# Patient Record
Sex: MALE | Race: WHITE
[De-identification: names, ages, dates, MRNs, and addresses within clinical notes are randomized per-mention and may not be internally consistent; named-entity substitution may affect disease eponyms.]

---

## 2020-08-02 ENCOUNTER — HOSPITAL ENCOUNTER (INPATIENT)
Dept: HOSPITAL 96 - M.ERS | Age: 50
LOS: 3 days | DRG: 871 | End: 2020-08-05
Attending: INTERNAL MEDICINE | Admitting: INTERNAL MEDICINE
Payer: MEDICARE

## 2020-08-02 VITALS — DIASTOLIC BLOOD PRESSURE: 79 MMHG | SYSTOLIC BLOOD PRESSURE: 119 MMHG

## 2020-08-02 VITALS — DIASTOLIC BLOOD PRESSURE: 71 MMHG | SYSTOLIC BLOOD PRESSURE: 125 MMHG

## 2020-08-02 VITALS — DIASTOLIC BLOOD PRESSURE: 72 MMHG | SYSTOLIC BLOOD PRESSURE: 105 MMHG

## 2020-08-02 VITALS — DIASTOLIC BLOOD PRESSURE: 84 MMHG | SYSTOLIC BLOOD PRESSURE: 118 MMHG

## 2020-08-02 VITALS — DIASTOLIC BLOOD PRESSURE: 66 MMHG | SYSTOLIC BLOOD PRESSURE: 117 MMHG

## 2020-08-02 VITALS — DIASTOLIC BLOOD PRESSURE: 50 MMHG | SYSTOLIC BLOOD PRESSURE: 124 MMHG

## 2020-08-02 VITALS — DIASTOLIC BLOOD PRESSURE: 78 MMHG | SYSTOLIC BLOOD PRESSURE: 124 MMHG

## 2020-08-02 VITALS — SYSTOLIC BLOOD PRESSURE: 109 MMHG | DIASTOLIC BLOOD PRESSURE: 53 MMHG

## 2020-08-02 VITALS — SYSTOLIC BLOOD PRESSURE: 117 MMHG | DIASTOLIC BLOOD PRESSURE: 67 MMHG

## 2020-08-02 VITALS — DIASTOLIC BLOOD PRESSURE: 76 MMHG | SYSTOLIC BLOOD PRESSURE: 132 MMHG

## 2020-08-02 VITALS — DIASTOLIC BLOOD PRESSURE: 76 MMHG | SYSTOLIC BLOOD PRESSURE: 135 MMHG

## 2020-08-02 VITALS — DIASTOLIC BLOOD PRESSURE: 75 MMHG | SYSTOLIC BLOOD PRESSURE: 133 MMHG

## 2020-08-02 VITALS — DIASTOLIC BLOOD PRESSURE: 82 MMHG | SYSTOLIC BLOOD PRESSURE: 139 MMHG

## 2020-08-02 VITALS — SYSTOLIC BLOOD PRESSURE: 110 MMHG | DIASTOLIC BLOOD PRESSURE: 74 MMHG

## 2020-08-02 VITALS — DIASTOLIC BLOOD PRESSURE: 56 MMHG | SYSTOLIC BLOOD PRESSURE: 117 MMHG

## 2020-08-02 VITALS — SYSTOLIC BLOOD PRESSURE: 108 MMHG | DIASTOLIC BLOOD PRESSURE: 53 MMHG

## 2020-08-02 VITALS — DIASTOLIC BLOOD PRESSURE: 79 MMHG | SYSTOLIC BLOOD PRESSURE: 138 MMHG

## 2020-08-02 VITALS — SYSTOLIC BLOOD PRESSURE: 124 MMHG | DIASTOLIC BLOOD PRESSURE: 70 MMHG

## 2020-08-02 VITALS — SYSTOLIC BLOOD PRESSURE: 133 MMHG | DIASTOLIC BLOOD PRESSURE: 82 MMHG

## 2020-08-02 VITALS — DIASTOLIC BLOOD PRESSURE: 56 MMHG | SYSTOLIC BLOOD PRESSURE: 112 MMHG

## 2020-08-02 VITALS — DIASTOLIC BLOOD PRESSURE: 70 MMHG | SYSTOLIC BLOOD PRESSURE: 107 MMHG

## 2020-08-02 VITALS — SYSTOLIC BLOOD PRESSURE: 123 MMHG | DIASTOLIC BLOOD PRESSURE: 80 MMHG

## 2020-08-02 VITALS — DIASTOLIC BLOOD PRESSURE: 84 MMHG | SYSTOLIC BLOOD PRESSURE: 135 MMHG

## 2020-08-02 VITALS — DIASTOLIC BLOOD PRESSURE: 79 MMHG | SYSTOLIC BLOOD PRESSURE: 131 MMHG

## 2020-08-02 VITALS — DIASTOLIC BLOOD PRESSURE: 72 MMHG | SYSTOLIC BLOOD PRESSURE: 108 MMHG

## 2020-08-02 VITALS — DIASTOLIC BLOOD PRESSURE: 61 MMHG | SYSTOLIC BLOOD PRESSURE: 129 MMHG

## 2020-08-02 VITALS — SYSTOLIC BLOOD PRESSURE: 107 MMHG | DIASTOLIC BLOOD PRESSURE: 53 MMHG

## 2020-08-02 VITALS — DIASTOLIC BLOOD PRESSURE: 74 MMHG | SYSTOLIC BLOOD PRESSURE: 124 MMHG

## 2020-08-02 VITALS — DIASTOLIC BLOOD PRESSURE: 90 MMHG | SYSTOLIC BLOOD PRESSURE: 129 MMHG

## 2020-08-02 VITALS — DIASTOLIC BLOOD PRESSURE: 67 MMHG | SYSTOLIC BLOOD PRESSURE: 103 MMHG

## 2020-08-02 VITALS — SYSTOLIC BLOOD PRESSURE: 112 MMHG | DIASTOLIC BLOOD PRESSURE: 73 MMHG

## 2020-08-02 VITALS — SYSTOLIC BLOOD PRESSURE: 107 MMHG | DIASTOLIC BLOOD PRESSURE: 70 MMHG

## 2020-08-02 VITALS — SYSTOLIC BLOOD PRESSURE: 126 MMHG | DIASTOLIC BLOOD PRESSURE: 80 MMHG

## 2020-08-02 VITALS — DIASTOLIC BLOOD PRESSURE: 86 MMHG | SYSTOLIC BLOOD PRESSURE: 136 MMHG

## 2020-08-02 VITALS — DIASTOLIC BLOOD PRESSURE: 73 MMHG | SYSTOLIC BLOOD PRESSURE: 129 MMHG

## 2020-08-02 VITALS — DIASTOLIC BLOOD PRESSURE: 81 MMHG | SYSTOLIC BLOOD PRESSURE: 115 MMHG

## 2020-08-02 VITALS — DIASTOLIC BLOOD PRESSURE: 65 MMHG | SYSTOLIC BLOOD PRESSURE: 121 MMHG

## 2020-08-02 VITALS — SYSTOLIC BLOOD PRESSURE: 132 MMHG | DIASTOLIC BLOOD PRESSURE: 75 MMHG

## 2020-08-02 VITALS — SYSTOLIC BLOOD PRESSURE: 113 MMHG | DIASTOLIC BLOOD PRESSURE: 53 MMHG

## 2020-08-02 VITALS — SYSTOLIC BLOOD PRESSURE: 116 MMHG | DIASTOLIC BLOOD PRESSURE: 78 MMHG

## 2020-08-02 VITALS — DIASTOLIC BLOOD PRESSURE: 77 MMHG | SYSTOLIC BLOOD PRESSURE: 107 MMHG

## 2020-08-02 VITALS — DIASTOLIC BLOOD PRESSURE: 73 MMHG | SYSTOLIC BLOOD PRESSURE: 145 MMHG

## 2020-08-02 VITALS — DIASTOLIC BLOOD PRESSURE: 60 MMHG | SYSTOLIC BLOOD PRESSURE: 118 MMHG

## 2020-08-02 VITALS — DIASTOLIC BLOOD PRESSURE: 56 MMHG | SYSTOLIC BLOOD PRESSURE: 106 MMHG

## 2020-08-02 VITALS — DIASTOLIC BLOOD PRESSURE: 77 MMHG | SYSTOLIC BLOOD PRESSURE: 130 MMHG

## 2020-08-02 VITALS — SYSTOLIC BLOOD PRESSURE: 115 MMHG | DIASTOLIC BLOOD PRESSURE: 67 MMHG

## 2020-08-02 VITALS — HEIGHT: 65.98 IN | BODY MASS INDEX: 46.48 KG/M2 | WEIGHT: 289.2 LBS

## 2020-08-02 VITALS — DIASTOLIC BLOOD PRESSURE: 78 MMHG | SYSTOLIC BLOOD PRESSURE: 140 MMHG

## 2020-08-02 VITALS — DIASTOLIC BLOOD PRESSURE: 76 MMHG | SYSTOLIC BLOOD PRESSURE: 111 MMHG

## 2020-08-02 VITALS — SYSTOLIC BLOOD PRESSURE: 116 MMHG | DIASTOLIC BLOOD PRESSURE: 71 MMHG

## 2020-08-02 VITALS — SYSTOLIC BLOOD PRESSURE: 111 MMHG | DIASTOLIC BLOOD PRESSURE: 69 MMHG

## 2020-08-02 VITALS — SYSTOLIC BLOOD PRESSURE: 116 MMHG | DIASTOLIC BLOOD PRESSURE: 83 MMHG

## 2020-08-02 VITALS — DIASTOLIC BLOOD PRESSURE: 77 MMHG | SYSTOLIC BLOOD PRESSURE: 132 MMHG

## 2020-08-02 VITALS — SYSTOLIC BLOOD PRESSURE: 128 MMHG | DIASTOLIC BLOOD PRESSURE: 65 MMHG

## 2020-08-02 VITALS — DIASTOLIC BLOOD PRESSURE: 75 MMHG | SYSTOLIC BLOOD PRESSURE: 141 MMHG

## 2020-08-02 VITALS — SYSTOLIC BLOOD PRESSURE: 116 MMHG | DIASTOLIC BLOOD PRESSURE: 76 MMHG

## 2020-08-02 VITALS — SYSTOLIC BLOOD PRESSURE: 124 MMHG | DIASTOLIC BLOOD PRESSURE: 75 MMHG

## 2020-08-02 VITALS — DIASTOLIC BLOOD PRESSURE: 80 MMHG | SYSTOLIC BLOOD PRESSURE: 127 MMHG

## 2020-08-02 VITALS — DIASTOLIC BLOOD PRESSURE: 55 MMHG | SYSTOLIC BLOOD PRESSURE: 117 MMHG

## 2020-08-02 VITALS — SYSTOLIC BLOOD PRESSURE: 144 MMHG | DIASTOLIC BLOOD PRESSURE: 83 MMHG

## 2020-08-02 VITALS — DIASTOLIC BLOOD PRESSURE: 67 MMHG | SYSTOLIC BLOOD PRESSURE: 114 MMHG

## 2020-08-02 VITALS — SYSTOLIC BLOOD PRESSURE: 131 MMHG | DIASTOLIC BLOOD PRESSURE: 74 MMHG

## 2020-08-02 VITALS — DIASTOLIC BLOOD PRESSURE: 83 MMHG | SYSTOLIC BLOOD PRESSURE: 135 MMHG

## 2020-08-02 VITALS — SYSTOLIC BLOOD PRESSURE: 111 MMHG | DIASTOLIC BLOOD PRESSURE: 76 MMHG

## 2020-08-02 VITALS — DIASTOLIC BLOOD PRESSURE: 77 MMHG | SYSTOLIC BLOOD PRESSURE: 115 MMHG

## 2020-08-02 VITALS — SYSTOLIC BLOOD PRESSURE: 122 MMHG | DIASTOLIC BLOOD PRESSURE: 56 MMHG

## 2020-08-02 VITALS — SYSTOLIC BLOOD PRESSURE: 124 MMHG | DIASTOLIC BLOOD PRESSURE: 58 MMHG

## 2020-08-02 VITALS — SYSTOLIC BLOOD PRESSURE: 120 MMHG | DIASTOLIC BLOOD PRESSURE: 80 MMHG

## 2020-08-02 VITALS — DIASTOLIC BLOOD PRESSURE: 81 MMHG | SYSTOLIC BLOOD PRESSURE: 117 MMHG

## 2020-08-02 VITALS — DIASTOLIC BLOOD PRESSURE: 82 MMHG | SYSTOLIC BLOOD PRESSURE: 143 MMHG

## 2020-08-02 VITALS — SYSTOLIC BLOOD PRESSURE: 138 MMHG | DIASTOLIC BLOOD PRESSURE: 86 MMHG

## 2020-08-02 VITALS — SYSTOLIC BLOOD PRESSURE: 127 MMHG | DIASTOLIC BLOOD PRESSURE: 73 MMHG

## 2020-08-02 VITALS — DIASTOLIC BLOOD PRESSURE: 74 MMHG | SYSTOLIC BLOOD PRESSURE: 125 MMHG

## 2020-08-02 VITALS — SYSTOLIC BLOOD PRESSURE: 113 MMHG | DIASTOLIC BLOOD PRESSURE: 77 MMHG

## 2020-08-02 DIAGNOSIS — T68.XXXA: ICD-10-CM

## 2020-08-02 DIAGNOSIS — I46.9: ICD-10-CM
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
## 2020-08-02 DIAGNOSIS — I95.9: ICD-10-CM

## 2020-08-02 DIAGNOSIS — I10: ICD-10-CM

## 2020-08-02 DIAGNOSIS — J96.01: ICD-10-CM

## 2020-08-02 DIAGNOSIS — J96.02: ICD-10-CM

## 2020-08-02 DIAGNOSIS — G93.1: ICD-10-CM

## 2020-08-02 DIAGNOSIS — E66.01: ICD-10-CM

## 2020-08-02 DIAGNOSIS — E78.00: ICD-10-CM

## 2020-08-02 DIAGNOSIS — Z03.818: ICD-10-CM

## 2020-08-02 DIAGNOSIS — R31.9: ICD-10-CM

## 2020-08-02 DIAGNOSIS — E83.51: ICD-10-CM

## 2020-08-02 DIAGNOSIS — E11.65: ICD-10-CM

## 2020-08-02 DIAGNOSIS — E83.39: ICD-10-CM

## 2020-08-02 DIAGNOSIS — J69.0: ICD-10-CM

## 2020-08-02 DIAGNOSIS — Z79.899: ICD-10-CM

## 2020-08-02 DIAGNOSIS — F19.10: ICD-10-CM

## 2020-08-02 DIAGNOSIS — N17.0: ICD-10-CM

## 2020-08-02 DIAGNOSIS — E87.6: ICD-10-CM

## 2020-08-02 DIAGNOSIS — E78.5: ICD-10-CM

## 2020-08-02 DIAGNOSIS — A41.9: Primary | ICD-10-CM

## 2020-08-02 DIAGNOSIS — J98.01: ICD-10-CM
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
## 2020-08-02 DIAGNOSIS — T17.898A: ICD-10-CM

## 2020-08-02 DIAGNOSIS — K92.2: ICD-10-CM
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
## 2020-08-02 LAB
ABSOLUTE BASOPHILS: 0 THOU/UL (ref 0–0.2)
ABSOLUTE BASOPHILS: 0.1 THOU/UL (ref 0–0.2)
ABSOLUTE EOSINOPHILS: 0 THOU/UL (ref 0–0.7)
ABSOLUTE EOSINOPHILS: 0.1 THOU/UL (ref 0–0.7)
ABSOLUTE MONOCYTES: 0.2 THOU/UL (ref 0–1.2)
ABSOLUTE MONOCYTES: 0.3 THOU/UL (ref 0–1.2)
ABSOLUTE MONOCYTES: 0.7 THOU/UL (ref 0–1.2)
ALBUMIN SERPL-MCNC: 4 G/DL (ref 3.4–5)
ALP SERPL-CCNC: 107 U/L (ref 46–116)
ALT SERPL-CCNC: 148 U/L (ref 30–65)
ANION GAP SERPL CALC-SCNC: 13 MMOL/L (ref 7–16)
ANION GAP SERPL CALC-SCNC: 15 MMOL/L (ref 7–16)
ANION GAP SERPL CALC-SCNC: 15 MMOL/L (ref 7–16)
ANION GAP SERPL CALC-SCNC: 17 MMOL/L (ref 7–16)
APTT BLD: 26.6 SECONDS (ref 25–31.3)
APTT BLD: 29 SECONDS (ref 25–31.3)
APTT BLD: 36.3 SECONDS (ref 25–31.3)
AST SERPL-CCNC: 195 U/L (ref 15–37)
BASOPHILS NFR BLD AUTO: 0.1 %
BASOPHILS NFR BLD AUTO: 0.3 %
BE: -15.2 MMOL/L
BE: -15.3 MMOL/L
BE: -15.6 MMOL/L
BE: -17.3 MMOL/L
BE: -17.8 MMOL/L
BENZODIAZ UR-MCNC: POSITIVE UG/L
BILIRUB SERPL-MCNC: 0.3 MG/DL
BILIRUB UR-MCNC: NEGATIVE MG/DL
BUN SERPL-MCNC: 19 MG/DL (ref 7–18)
BUN SERPL-MCNC: 24 MG/DL (ref 7–18)
BUN SERPL-MCNC: 27 MG/DL (ref 7–18)
BUN SERPL-MCNC: 29 MG/DL (ref 7–18)
CALCIUM SERPL-MCNC: 6.3 MG/DL (ref 8.5–10.1)
CALCIUM SERPL-MCNC: 6.3 MG/DL (ref 8.5–10.1)
CALCIUM SERPL-MCNC: 7.1 MG/DL (ref 8.5–10.1)
CALCIUM SERPL-MCNC: 7.2 MG/DL (ref 8.5–10.1)
CALCIUM SERPL-MCNC: 9.2 MG/DL (ref 8.5–10.1)
CHLORIDE SERPL-SCNC: 101 MMOL/L (ref 98–107)
CHLORIDE SERPL-SCNC: 102 MMOL/L (ref 98–107)
CHLORIDE SERPL-SCNC: 103 MMOL/L (ref 98–107)
CHLORIDE SERPL-SCNC: 98 MMOL/L (ref 98–107)
CK-MB MASS: 11.4 NG/ML
CK-MB MASS: 19.1 NG/ML
CK-MB MASS: 29.2 NG/ML
CO2 SERPL-SCNC: 20 MMOL/L (ref 21–32)
CO2 SERPL-SCNC: 20 MMOL/L (ref 21–32)
CO2 SERPL-SCNC: 21 MMOL/L (ref 21–32)
CO2 SERPL-SCNC: 22 MMOL/L (ref 21–32)
COARSE GRAN CASTS #/AREA URNS LPF: (no result) /LPF
COLOR UR: YELLOW
CREAT SERPL-MCNC: 1.9 MG/DL (ref 0.6–1.3)
CREAT SERPL-MCNC: 2 MG/DL (ref 0.6–1.3)
CREAT SERPL-MCNC: 2.5 MG/DL (ref 0.6–1.3)
CREAT SERPL-MCNC: 2.6 MG/DL (ref 0.6–1.3)
EOSINOPHIL NFR BLD: 0.1 %
EOSINOPHIL NFR BLD: 0.7 %
FIBRINOGEN PPP-MCNC: 149 MG/DL (ref 200–340)
FIBRINOGEN PPP-MCNC: 210 MG/DL (ref 200–340)
FIBRINOGEN PPP-MCNC: 224 MG/DL (ref 200–340)
GLUCOSE SERPL-MCNC: 495 MG/DL (ref 70–99)
GLUCOSE SERPL-MCNC: 510 MG/DL (ref 70–99)
GLUCOSE SERPL-MCNC: 541 MG/DL (ref 70–99)
GLUCOSE SERPL-MCNC: 659 MG/DL (ref 70–99)
GRANULOCYTES NFR BLD MANUAL: 55.7 %
GRANULOCYTES NFR BLD MANUAL: 65 %
GRANULOCYTES NFR BLD MANUAL: 91.5 %
HCT VFR BLD CALC: 47.8 % (ref 42–52)
HCT VFR BLD CALC: 49.1 % (ref 42–52)
HCT VFR BLD CALC: 50.7 % (ref 42–52)
HGB BLD-MCNC: 15.5 GM/DL (ref 14–18)
HGB BLD-MCNC: 16.5 GM/DL (ref 14–18)
HGB BLD-MCNC: 16.8 GM/DL (ref 14–18)
INR PPP: 1.1
INR PPP: 1.2
INR PPP: 1.3
KETONES UR STRIP-MCNC: NEGATIVE MG/DL
LIPASE: 540 U/L (ref 73–393)
LYMPHOCYTES # BLD: 0.7 THOU/UL (ref 0.8–5.3)
LYMPHOCYTES # BLD: 1.3 THOU/UL (ref 0.8–5.3)
LYMPHOCYTES # BLD: 7.1 THOU/UL (ref 0.8–5.3)
LYMPHOCYTES NFR BLD AUTO: 39.4 %
LYMPHOCYTES NFR BLD AUTO: 5.7 %
LYMPHOCYTES NFR BLD AUTO: 8 %
MAGNESIUM SERPL-MCNC: 1.7 MG/DL (ref 1.8–2.4)
MAGNESIUM SERPL-MCNC: 1.8 MG/DL (ref 1.8–2.4)
MAGNESIUM SERPL-MCNC: 2.3 MG/DL (ref 1.8–2.4)
MCH RBC QN AUTO: 30.8 PG (ref 26–34)
MCH RBC QN AUTO: 30.9 PG (ref 26–34)
MCH RBC QN AUTO: 31.5 PG (ref 26–34)
MCHC RBC AUTO-ENTMCNC: 32.5 G/DL (ref 28–37)
MCHC RBC AUTO-ENTMCNC: 33.2 G/DL (ref 28–37)
MCHC RBC AUTO-ENTMCNC: 33.6 G/DL (ref 28–37)
MCV RBC: 91.9 FL (ref 80–100)
MCV RBC: 93.1 FL (ref 80–100)
MCV RBC: 96.9 FL (ref 80–100)
METAMYELOCYTES NFR BLD: 3 %
MONOCYTES NFR BLD: 1 %
MONOCYTES NFR BLD: 2.6 %
MONOCYTES NFR BLD: 3.9 %
MPV: 7.2 FL. (ref 7.2–11.1)
MPV: 7.3 FL. (ref 7.2–11.1)
MPV: 8.1 FL. (ref 7.2–11.1)
NEUTROPHILS # BLD: 10.1 THOU/UL (ref 1.6–8.1)
NEUTROPHILS # BLD: 10.5 THOU/UL (ref 1.6–8.1)
NEUTROPHILS # BLD: 15.3 THOU/UL (ref 1.6–8.1)
NEUTS BAND NFR BLD: 23 %
NT-PRO BRAIN NAT PEPTIDE: 187 PG/ML (ref ?–300)
NT-PRO BRAIN NAT PEPTIDE: 53 PG/ML (ref ?–300)
NT-PRO BRAIN NAT PEPTIDE: 84 PG/ML (ref ?–300)
NUCLEATED RBCS: 0 /100WBC
NUCLEATED RBCS: 1 /100WBC
NUCLEATED RBCS: 1 /100WBC
OPIATES UR-MCNC: POSITIVE NG/ML
PCO2 BLD: 112.6 MMHG (ref 35–45)
PCO2 BLD: 51.8 MMHG (ref 35–45)
PCO2 BLD: 65.2 MMHG (ref 35–45)
PCO2 BLD: 68 MMHG (ref 35–45)
PCO2 BLD: 92.1 MMHG (ref 35–45)
PHOSPHATE SERPL-MCNC: 3.7 MG/DL (ref 2.5–4.9)
PHOSPHATE SERPL-MCNC: 7.7 MG/DL (ref 2.5–4.9)
PHOSPHATE SERPL-MCNC: 8.9 MG/DL (ref 2.5–4.9)
PLATELET # BLD EST: ADEQUATE 10*3/UL
PLATELET COUNT*: 213 THOU/UL (ref 150–400)
PLATELET COUNT*: 214 THOU/UL (ref 150–400)
PLATELET COUNT*: 243 THOU/UL (ref 150–400)
PO2 BLD: 103.6 MMHG (ref 75–100)
PO2 BLD: 105.2 MMHG (ref 75–100)
PO2 BLD: 152.6 MMHG (ref 75–100)
PO2 BLD: 162.4 MMHG (ref 75–100)
PO2 BLD: 94.9 MMHG (ref 75–100)
POTASSIUM SERPL-SCNC: 3 MMOL/L (ref 3.5–5.1)
POTASSIUM SERPL-SCNC: 3.1 MMOL/L (ref 3.5–5.1)
POTASSIUM SERPL-SCNC: 4 MMOL/L (ref 3.5–5.1)
POTASSIUM SERPL-SCNC: 4.2 MMOL/L (ref 3.5–5.1)
PROT SERPL-MCNC: 7.5 G/DL (ref 6.4–8.2)
PROT UR QL STRIP: (no result)
PROTHROMBIN TIME: 11.1 SECONDS (ref 9.2–11.5)
PROTHROMBIN TIME: 12.5 SECONDS (ref 9.2–11.5)
PROTHROMBIN TIME: 13.5 SECONDS (ref 9.2–11.5)
RBC # BLD AUTO: 4.93 MIL/UL (ref 4.5–6)
RBC # BLD AUTO: 5.35 MIL/UL (ref 4.5–6)
RBC # BLD AUTO: 5.45 MIL/UL (ref 4.5–6)
RBC # UR STRIP: (no result) /UL
RBC #/AREA URNS HPF: (no result) /HPF (ref 0–2)
RBC MORPH BLD: NORMAL
RDW-CV: 14.4 % (ref 10.5–14.5)
RDW-CV: 14.9 % (ref 10.5–14.5)
RDW-CV: 15 % (ref 10.5–14.5)
SODIUM SERPL-SCNC: 135 MMOL/L (ref 136–145)
SODIUM SERPL-SCNC: 137 MMOL/L (ref 136–145)
SODIUM SERPL-SCNC: 137 MMOL/L (ref 136–145)
SODIUM SERPL-SCNC: 138 MMOL/L (ref 136–145)
SP GR UR STRIP: 1.02 (ref 1–1.03)
SQUAMOUS: (no result) /LPF (ref 0–3)
THC: POSITIVE
TROPONIN-I LEVEL: 0.84 NG/ML (ref ?–0.06)
TROPONIN-I LEVEL: 1.24 NG/ML (ref ?–0.06)
URINE CLARITY: (no result)
URINE GLUCOSE-RANDOM: (no result)
URINE LEUKOCYTES-REFLEX: NEGATIVE
URINE NITRITE-REFLEX: NEGATIVE
URINE WBC-REFLEX: (no result) /HPF (ref 0–5)
UROBILINOGEN UR STRIP-ACNC: 0.2 E.U./DL (ref 0.2–1)
WBC # BLD AUTO: 11.5 THOU/UL (ref 4–11)
WBC # BLD AUTO: 16.8 THOU/UL (ref 4–11)
WBC # BLD AUTO: 18.1 THOU/UL (ref 4–11)

## 2020-08-02 PROCEDURE — B548ZZA ULTRASONOGRAPHY OF SUPERIOR VENA CAVA, GUIDANCE: ICD-10-PCS

## 2020-08-02 PROCEDURE — 0BH17EZ INSERTION OF ENDOTRACHEAL AIRWAY INTO TRACHEA, VIA NATURAL OR ARTIFICIAL OPENING: ICD-10-PCS | Performed by: INTERNAL MEDICINE

## 2020-08-02 PROCEDURE — 5A1945Z RESPIRATORY VENTILATION, 24-96 CONSECUTIVE HOURS: ICD-10-PCS | Performed by: INTERNAL MEDICINE

## 2020-08-02 PROCEDURE — 02HV33Z INSERTION OF INFUSION DEVICE INTO SUPERIOR VENA CAVA, PERCUTANEOUS APPROACH: ICD-10-PCS

## 2020-08-02 NOTE — CON
[address]  [address]                    CONSULTATION                  
_______________________________________________________________________________
 
Name:       ALFREDA VELA         Room:           [address]    ADM [place]  
M.R.#:  [id number]      Account #:      [id number]  
Admission:  08/02/20     Attend Phys:    Hillary Love MD 
Discharge:               Date of Birth:  [birth year]  
         Report #: [id number]
                                                                     [id number]  
_______________________________________________________________________________
THIS REPORT FOR:  //name//                      
 
cc:  Jam Chaparro MD, Khanh MD                                                      ~
THIS REPORT FOR:  //name//                      
 
CC: Jam Love
 
DATE OF SERVICE:  08/02/2020
 
 
NEUROLOGY CONSULTATION
 
CONSULTING PHYSICIAN:  Dr. Love.
 
REASON FOR CONSULTATION:  Acute kidney injury.
 
HISTORY OF PRESENT ILLNESS:  A 50-year-old gentleman who was brought in with
out-of-hospital cardiac arrest.  He is intubated and currently on two
vasopressors.  He is being followed by Cardiology, Pulmonology in the hospital
and hospitalist.  I was asked to see him because of an elevated creatinine and
some poor urine output.  His creatinine on admission was 1.9.  Baseline
creatinine is unknown.  He has had some significant hyperglycemia as well and
has been started on antibiotics.  His urine drug screen was positive for
marijuana.  It was thought that he was down for about 20 minutes and there is
some concern for polysubstance abuse as well.
 
REVIEW OF SYSTEMS:  Constitutional, psych, heme, eyes, ENT, respiratory,
cardiac, GI, , endocrine, all negative except as can be documented above and
as best as can be ascertained.
 
PAST MEDICAL HISTORY:  Diabetes, hypertension, dyslipidemia, history of
marijuana use.
 
SOCIAL HISTORY:  Positive for marijuana.
 
FAMILY HISTORY:  Not pertinent in this 50-year-old gentleman.
 
CURRENT MEDICATIONS:  Reviewed.
 
PHYSICAL EXAMINATION:
VITAL SIGNS:  Blood pressure 124/78, pulse 70, respirations 20, temperature
32.1.  He is on hypothermia protocol.
GENERAL:  On respiratory support.
HEAD:  Normocephalic, atraumatic.
 
 
 
[place] [address]                    CONSULTATION                  
_______________________________________________________________________________
 
Name:       ALFREDA VELA         Room:           [address] [place]  
[facility]#:  [id number]      Account #:      [id number]  
Admission:  08/02/20     Attend Phys:    Hillary Love MD 
Discharge:               Date of Birth:  [birth year]  
         Report #: [id number]
                                                                     [id number]  
_______________________________________________________________________________
CARDIOVASCULAR:  Regular rate.
LUNGS:  Diminished.
ABDOMEN:  Distended.
MUSCULOSKELETAL:  No significant pitting edema.
NEUROLOGIC:  Unresponsive and currently sedated.
 
LABORATORY DATA:  White cell count 16.8, hemoglobin 16.8, platelets 243.  Sodium
135, potassium 4.2, chloride 101, bicarbonate 21, BUN 24, creatinine 2, glucose
541, calcium 7.1, phosphorus 7.1, magnesium 1.8.
 
ASSESSMENT:
1.  Acute kidney injury with a creatinine of 1.9.  Baseline creatinine is
unknown.  UA is noted and was abnormal showing some hematuria and proteinuria
and this is in the setting of shock and out-of-hospital cardiac arrest.
2.  Severe acidosis with a pH of 7.02, pCO2 of 68 and a bicarbonate of 17.  This
is respiratory acidosis with an elevated anion gap metabolic acidosis and normal
anion gap acidosis with an anion gap of 17 and a delta delta of 0.7.  This is in
the setting of elevated blood sugars and acute kidney injury.  His alcohol level
was less than 10 and his UA did not show any ketones.
3.  Urine drug screen positive for marijuana.
4.  Hematuria.
5.  Proteinuria.
6.  Shock, requiring vasopressor support.
7.  Out-of-hospital cardiac arrest on code ICE protocol.
8.  Diabetes with significant hyperglycemia.
9.  Hyperphosphatemia with phosphorus 7.7 on 08/02/2020.
10.  Hypocalcemia with a calcium 7.1 on 08/02/2020.
 
PLAN:
1.  Creatinine is 2.  Urine output diminished.  Hollis catheter, which was
manipulated by ICU nurse and urine output seems to have improved quite a bit.
2.  We will check renal ultrasound.
3.  Check urine protein to creatinine ratio.
4.  The patient is on bicarbonate drip.  I am informed by the ICU nurse that
Pulmonology will be managing the bicarbonate drip.  We will defer to them and
repeat gas has been ordered by Pulmonology.
5.  Rewarming is planned for 3:00 a.m.
6.  Currently on two vasopressors as well as antibiotics.
7.  We will send serologic workup and check for MICHELLE, ANCA, anti-GBM, hepatitis
B, hepatitis C, rheumatoid factor.  We will also check a CK.
8.  We will give IV calcium.
9.  Check CK.
10.  [place] [address]                    CONSULTATION                  
_______________________________________________________________________________
 
Name:       AFLREDA VELA         Room:           [address] [place]  
..#:  [id number]      Account #:      [id number]  
Admission:  08/02/20     Attend Phys:    Hillary Love MD 
Discharge:               Date of Birth:  [birth year]  
         Report #: [id number]
                                                                     [id number]  
_______________________________________________________________________________
 
Thank you for requesting my opinion in the care and management of this patient.
 
 
 
 
 
 
 
 
 
 
 
 
 
 
 
 
 
 
 
 
 
 
 
 
 
 
 
 
 
 
 
 
 
 
 
 
 
 
 
 
 
 
                       
                                        By:                                
                 
D: 08/02/20 1301_______________________________________
T: 08/02/20 1434Abid MUSTAPHA Rock MD                 /nt

## 2020-08-02 NOTE — NUR
SPOKE TO DR RODRÍGUEZ REGARDING MOST RECENT ABNORMAL LABS. ORDER FOR POTASSIUM
ACETATE 50MEQ/250 CC NS TRO 4 HRS. PHARMACY CALCULATED DOSE TO 6HR  AND
STARTED FOLLOWING 3RD KCL 20 MEQ DOSE. DR RODRÍGUEZ WANTED THE K/ACETE STARTED
BEFORE THE ORDERED 80 MEQ PROTOCOL DOSE INFUSED. PT ALSO GIVEN MAGNESIUM 2GM
IVPB AT THIS TIME. LEVOPHED CONT'S AT 30 MCG/MIN, BICARB AT 125CC/HR, FENTANYL
50 MCG AND VERSED AT 2 MG/HR, NIMBEX AT 37.5/HR.  HIS PRESSURES HAVE BEEN
LABILE INTERMITTENTLU WITHOUT OUTSIDE STIMULUS. SBP /30-60. MAP HAS
CONT'D 70'S.  HE DOES NOT ERSPOND TO ANY TACTILE STIMULUS OR PROCEDURE.
PUPILS REMAIN FULLY DILATED WITH SLOW RESPONSE? I HAVE BEEN MONITORING URINE
OUTPUT HOURLY SINCE SHIFT CHANGE AND PT HAS GONE FROM 80 TO CURRENT
20+/HR.WILL WATCH A FEW MORE HOURS AND CONTACT MD IF IT STAYS BELOW 30CC/HR
FOR 4 HRS.

## 2020-08-02 NOTE — NUR
RECEIVED REPORT AND ASSUMED CARE AT 0342. PT TRANSPORTED FROM ED TO ROOM ICU
05. HYPOTHERMIA INITIATED AT 0347. STARTING TEMP AT 0347 WAS 92.5 F PER [place]
TEMP PROBE. GOAL TEMP OF 91.0 F REACHED AT 0630. LAB RESULTS RECEIVED AND
COMMUNICATED WITH PHYSICIAN. CT OF HEAD NOT COMPLETED PRIOR TO PT ARRIVING TO
ICU, PER PHYSICAN THIS AM, WILL WAIT FOR NEURO CONSULT PRIOR TO COMPLETING R/T
CODE ICE STATUS. BED LOCKED IN LOWEST POSITION, BED ALARM ON. PATIENT 1:1
WHILE IN COOLING STATE. REWARMING TO BEGIN AT 0347 ON 8/3/20.

## 2020-08-02 NOTE — EKG
[place] [address]
Phone:  [phone]                     ELECTROCARDIOGRAM REPORT      
_______________________________________________________________________________
 
Name:         ALFREDA VELA        Room:          [address]    ADM IN 
M.R.#:    [id number]     Account #:     [id number]  
Admission:    20    Attend Phys:   Hillary Love, 
Discharge:                Date of Birth: [birth year]  
Date of Service: 20 0046  Report #:      [id number]
        [id number]
_______________________________________________________________________________
THIS REPORT FOR:  //name//                      
 
                         [facility] ED
                                       
Test Date:    2020               Test Time:    00:46:43
Pat Name:     ALFREDA VELA           Department:   
Patient ID:   [id number]            Room:         [facility]
Gender:       M                        Technician:   DINESH
:          1970               Requested By: Macy Yanes
Order Number: [id number] MD:   Andrea Kumar
                                 Measurements
Intervals                              Axis          
Rate:         103                      P:            74
MT:           166                      QRS:          77
QRSD:         130                      T:            -55
QT:           368                                    
QTc:          482                                    
                           Interpretive Statements
Sinus tachycardia
Nonspecific intraventricular conduction delay
Repol abnrm suggests ischemia, inferior leads
No previous ECG available for comparison
Electronically Signed On 2020 12:55:05 CDT by Andrea Kumar
[email or web address]
 
 
 
 
 
 
 
 
 
 
 
 
 
 
 
 
 
 
 
 
  <ELECTRONICALLY SIGNED>
                                           By: Andrea Kumar MD, FACC   
  20     1255
D: 20 0046   _____________________________________
T: 20 0046   Andrea Kumar MD, [facility]     /EPI

## 2020-08-02 NOTE — NUR
RECEIVED REPORT FROM DAY RN AND ASSUMED CARE. PT INCONINENT OF FOUL LOOSE
STOOL. PARTIAL BATH AND LINEN CHANGE DONE

## 2020-08-02 NOTE — NUR
PT REMAINS INTUBATED PER ORDERED SETTINGS.CODE ICE PROTOCOL FOLLOWED.REWARMING
TO BEGIN @ 0347 08/03/20.MTN NOTFIED-PLEASE UPDATE WITH CHANGES.ART LINE
PLACED THIS AM IN LEFT RADIAL BY ANESTHESIA.WOUND PICTURE TAKEN OF ABRASION ON
RIGHT ABDOMEN.IO REMOVED FROM LEFT KNEE.PT STARTED ON PARALYTIC PER ORDERS-PER
 NO TRAIN OF FOUR NEEDS TO BE DONE BECAUSE WE ARE NOT TITRATING THE
MEDICATION.NEW CONSULTS CALLED-PLAN FOR EEG,ECHO,CT,US RENAL/LOWER EXTREMITIES
TOMORROW AFTER REWARMING.COVID TEST COLLECTED-ISOLATION MAINTAINED UNTIL
RESULTS.FAMILY UPDATED-SISTER ARRIVED AT ICU DOORS BUT WAS NOT ALLOWED IN DUE
TO PENDING COVID RESULTS.PT REMAINS 1:1-WILL CONTINUE TO MONITOR FOR DURATION
OF SHIFT.

## 2020-08-03 VITALS — DIASTOLIC BLOOD PRESSURE: 65 MMHG | SYSTOLIC BLOOD PRESSURE: 116 MMHG

## 2020-08-03 VITALS — DIASTOLIC BLOOD PRESSURE: 61 MMHG | SYSTOLIC BLOOD PRESSURE: 118 MMHG

## 2020-08-03 VITALS — SYSTOLIC BLOOD PRESSURE: 124 MMHG | DIASTOLIC BLOOD PRESSURE: 59 MMHG

## 2020-08-03 VITALS — DIASTOLIC BLOOD PRESSURE: 51 MMHG | SYSTOLIC BLOOD PRESSURE: 114 MMHG

## 2020-08-03 VITALS — SYSTOLIC BLOOD PRESSURE: 106 MMHG | DIASTOLIC BLOOD PRESSURE: 63 MMHG

## 2020-08-03 VITALS — DIASTOLIC BLOOD PRESSURE: 45 MMHG | SYSTOLIC BLOOD PRESSURE: 108 MMHG

## 2020-08-03 VITALS — DIASTOLIC BLOOD PRESSURE: 48 MMHG | SYSTOLIC BLOOD PRESSURE: 111 MMHG

## 2020-08-03 VITALS — DIASTOLIC BLOOD PRESSURE: 56 MMHG | SYSTOLIC BLOOD PRESSURE: 121 MMHG

## 2020-08-03 VITALS — DIASTOLIC BLOOD PRESSURE: 48 MMHG | SYSTOLIC BLOOD PRESSURE: 98 MMHG

## 2020-08-03 VITALS — DIASTOLIC BLOOD PRESSURE: 41 MMHG | SYSTOLIC BLOOD PRESSURE: 92 MMHG

## 2020-08-03 VITALS — SYSTOLIC BLOOD PRESSURE: 123 MMHG | DIASTOLIC BLOOD PRESSURE: 51 MMHG

## 2020-08-03 VITALS — DIASTOLIC BLOOD PRESSURE: 57 MMHG | SYSTOLIC BLOOD PRESSURE: 130 MMHG

## 2020-08-03 VITALS — DIASTOLIC BLOOD PRESSURE: 41 MMHG | SYSTOLIC BLOOD PRESSURE: 81 MMHG

## 2020-08-03 VITALS — SYSTOLIC BLOOD PRESSURE: 129 MMHG | DIASTOLIC BLOOD PRESSURE: 73 MMHG

## 2020-08-03 VITALS — SYSTOLIC BLOOD PRESSURE: 118 MMHG | DIASTOLIC BLOOD PRESSURE: 57 MMHG

## 2020-08-03 VITALS — SYSTOLIC BLOOD PRESSURE: 119 MMHG | DIASTOLIC BLOOD PRESSURE: 56 MMHG

## 2020-08-03 VITALS — SYSTOLIC BLOOD PRESSURE: 121 MMHG | DIASTOLIC BLOOD PRESSURE: 57 MMHG

## 2020-08-03 VITALS — DIASTOLIC BLOOD PRESSURE: 60 MMHG | SYSTOLIC BLOOD PRESSURE: 131 MMHG

## 2020-08-03 VITALS — DIASTOLIC BLOOD PRESSURE: 71 MMHG | SYSTOLIC BLOOD PRESSURE: 124 MMHG

## 2020-08-03 VITALS — DIASTOLIC BLOOD PRESSURE: 61 MMHG | SYSTOLIC BLOOD PRESSURE: 119 MMHG

## 2020-08-03 VITALS — DIASTOLIC BLOOD PRESSURE: 66 MMHG | SYSTOLIC BLOOD PRESSURE: 140 MMHG

## 2020-08-03 VITALS — DIASTOLIC BLOOD PRESSURE: 73 MMHG | SYSTOLIC BLOOD PRESSURE: 128 MMHG

## 2020-08-03 VITALS — SYSTOLIC BLOOD PRESSURE: 76 MMHG | DIASTOLIC BLOOD PRESSURE: 39 MMHG

## 2020-08-03 VITALS — DIASTOLIC BLOOD PRESSURE: 50 MMHG | SYSTOLIC BLOOD PRESSURE: 108 MMHG

## 2020-08-03 VITALS — DIASTOLIC BLOOD PRESSURE: 58 MMHG | SYSTOLIC BLOOD PRESSURE: 120 MMHG

## 2020-08-03 VITALS — SYSTOLIC BLOOD PRESSURE: 121 MMHG | DIASTOLIC BLOOD PRESSURE: 54 MMHG

## 2020-08-03 VITALS — SYSTOLIC BLOOD PRESSURE: 124 MMHG | DIASTOLIC BLOOD PRESSURE: 68 MMHG

## 2020-08-03 VITALS — DIASTOLIC BLOOD PRESSURE: 69 MMHG | SYSTOLIC BLOOD PRESSURE: 127 MMHG

## 2020-08-03 VITALS — SYSTOLIC BLOOD PRESSURE: 122 MMHG | DIASTOLIC BLOOD PRESSURE: 57 MMHG

## 2020-08-03 VITALS — DIASTOLIC BLOOD PRESSURE: 60 MMHG | SYSTOLIC BLOOD PRESSURE: 125 MMHG

## 2020-08-03 VITALS — SYSTOLIC BLOOD PRESSURE: 130 MMHG | DIASTOLIC BLOOD PRESSURE: 61 MMHG

## 2020-08-03 VITALS — SYSTOLIC BLOOD PRESSURE: 120 MMHG | DIASTOLIC BLOOD PRESSURE: 59 MMHG

## 2020-08-03 VITALS — SYSTOLIC BLOOD PRESSURE: 94 MMHG | DIASTOLIC BLOOD PRESSURE: 45 MMHG

## 2020-08-03 VITALS — DIASTOLIC BLOOD PRESSURE: 49 MMHG | SYSTOLIC BLOOD PRESSURE: 109 MMHG

## 2020-08-03 VITALS — SYSTOLIC BLOOD PRESSURE: 122 MMHG | DIASTOLIC BLOOD PRESSURE: 58 MMHG

## 2020-08-03 VITALS — DIASTOLIC BLOOD PRESSURE: 55 MMHG | SYSTOLIC BLOOD PRESSURE: 115 MMHG

## 2020-08-03 VITALS — SYSTOLIC BLOOD PRESSURE: 124 MMHG | DIASTOLIC BLOOD PRESSURE: 71 MMHG

## 2020-08-03 VITALS — SYSTOLIC BLOOD PRESSURE: 118 MMHG | DIASTOLIC BLOOD PRESSURE: 58 MMHG

## 2020-08-03 VITALS — SYSTOLIC BLOOD PRESSURE: 117 MMHG | DIASTOLIC BLOOD PRESSURE: 48 MMHG

## 2020-08-03 VITALS — SYSTOLIC BLOOD PRESSURE: 119 MMHG | DIASTOLIC BLOOD PRESSURE: 48 MMHG

## 2020-08-03 VITALS — DIASTOLIC BLOOD PRESSURE: 50 MMHG | SYSTOLIC BLOOD PRESSURE: 121 MMHG

## 2020-08-03 VITALS — SYSTOLIC BLOOD PRESSURE: 127 MMHG | DIASTOLIC BLOOD PRESSURE: 59 MMHG

## 2020-08-03 VITALS — SYSTOLIC BLOOD PRESSURE: 112 MMHG | DIASTOLIC BLOOD PRESSURE: 53 MMHG

## 2020-08-03 VITALS — SYSTOLIC BLOOD PRESSURE: 123 MMHG | DIASTOLIC BLOOD PRESSURE: 68 MMHG

## 2020-08-03 VITALS — DIASTOLIC BLOOD PRESSURE: 51 MMHG | SYSTOLIC BLOOD PRESSURE: 118 MMHG

## 2020-08-03 VITALS — DIASTOLIC BLOOD PRESSURE: 56 MMHG | SYSTOLIC BLOOD PRESSURE: 118 MMHG

## 2020-08-03 VITALS — DIASTOLIC BLOOD PRESSURE: 61 MMHG | SYSTOLIC BLOOD PRESSURE: 126 MMHG

## 2020-08-03 VITALS — SYSTOLIC BLOOD PRESSURE: 113 MMHG | DIASTOLIC BLOOD PRESSURE: 55 MMHG

## 2020-08-03 VITALS — DIASTOLIC BLOOD PRESSURE: 67 MMHG | SYSTOLIC BLOOD PRESSURE: 118 MMHG

## 2020-08-03 VITALS — SYSTOLIC BLOOD PRESSURE: 123 MMHG | DIASTOLIC BLOOD PRESSURE: 69 MMHG

## 2020-08-03 VITALS — SYSTOLIC BLOOD PRESSURE: 125 MMHG | DIASTOLIC BLOOD PRESSURE: 55 MMHG

## 2020-08-03 VITALS — DIASTOLIC BLOOD PRESSURE: 53 MMHG | SYSTOLIC BLOOD PRESSURE: 125 MMHG

## 2020-08-03 VITALS — DIASTOLIC BLOOD PRESSURE: 59 MMHG | SYSTOLIC BLOOD PRESSURE: 129 MMHG

## 2020-08-03 VITALS — DIASTOLIC BLOOD PRESSURE: 57 MMHG | SYSTOLIC BLOOD PRESSURE: 115 MMHG

## 2020-08-03 VITALS — DIASTOLIC BLOOD PRESSURE: 49 MMHG | SYSTOLIC BLOOD PRESSURE: 117 MMHG

## 2020-08-03 VITALS — DIASTOLIC BLOOD PRESSURE: 47 MMHG | SYSTOLIC BLOOD PRESSURE: 95 MMHG

## 2020-08-03 VITALS — SYSTOLIC BLOOD PRESSURE: 134 MMHG | DIASTOLIC BLOOD PRESSURE: 66 MMHG

## 2020-08-03 VITALS — DIASTOLIC BLOOD PRESSURE: 56 MMHG | SYSTOLIC BLOOD PRESSURE: 126 MMHG

## 2020-08-03 VITALS — DIASTOLIC BLOOD PRESSURE: 55 MMHG | SYSTOLIC BLOOD PRESSURE: 116 MMHG

## 2020-08-03 VITALS — SYSTOLIC BLOOD PRESSURE: 122 MMHG | DIASTOLIC BLOOD PRESSURE: 72 MMHG

## 2020-08-03 VITALS — SYSTOLIC BLOOD PRESSURE: 121 MMHG | DIASTOLIC BLOOD PRESSURE: 60 MMHG

## 2020-08-03 VITALS — SYSTOLIC BLOOD PRESSURE: 110 MMHG | DIASTOLIC BLOOD PRESSURE: 46 MMHG

## 2020-08-03 VITALS — DIASTOLIC BLOOD PRESSURE: 49 MMHG | SYSTOLIC BLOOD PRESSURE: 110 MMHG

## 2020-08-03 VITALS — DIASTOLIC BLOOD PRESSURE: 56 MMHG | SYSTOLIC BLOOD PRESSURE: 119 MMHG

## 2020-08-03 VITALS — SYSTOLIC BLOOD PRESSURE: 124 MMHG | DIASTOLIC BLOOD PRESSURE: 61 MMHG

## 2020-08-03 VITALS — DIASTOLIC BLOOD PRESSURE: 60 MMHG | SYSTOLIC BLOOD PRESSURE: 126 MMHG

## 2020-08-03 VITALS — DIASTOLIC BLOOD PRESSURE: 53 MMHG | SYSTOLIC BLOOD PRESSURE: 112 MMHG

## 2020-08-03 VITALS — DIASTOLIC BLOOD PRESSURE: 68 MMHG | SYSTOLIC BLOOD PRESSURE: 131 MMHG

## 2020-08-03 VITALS — DIASTOLIC BLOOD PRESSURE: 48 MMHG | SYSTOLIC BLOOD PRESSURE: 95 MMHG

## 2020-08-03 VITALS — SYSTOLIC BLOOD PRESSURE: 131 MMHG | DIASTOLIC BLOOD PRESSURE: 65 MMHG

## 2020-08-03 VITALS — DIASTOLIC BLOOD PRESSURE: 52 MMHG | SYSTOLIC BLOOD PRESSURE: 109 MMHG

## 2020-08-03 VITALS — SYSTOLIC BLOOD PRESSURE: 131 MMHG | DIASTOLIC BLOOD PRESSURE: 75 MMHG

## 2020-08-03 VITALS — DIASTOLIC BLOOD PRESSURE: 51 MMHG | SYSTOLIC BLOOD PRESSURE: 111 MMHG

## 2020-08-03 VITALS — SYSTOLIC BLOOD PRESSURE: 108 MMHG | DIASTOLIC BLOOD PRESSURE: 45 MMHG

## 2020-08-03 LAB
ABSOLUTE BASOPHILS: 0 THOU/UL (ref 0–0.2)
ABSOLUTE EOSINOPHILS: 0 THOU/UL (ref 0–0.7)
ABSOLUTE MONOCYTES: 0.3 THOU/UL (ref 0–1.2)
ALBUMIN SERPL-MCNC: 2.9 G/DL (ref 3.4–5)
ALBUMIN SERPL-MCNC: 2.9 G/DL (ref 3.4–5)
ALP SERPL-CCNC: 60 U/L (ref 46–116)
ALT SERPL-CCNC: 130 U/L (ref 30–65)
ANION GAP SERPL CALC-SCNC: 12 MMOL/L (ref 7–16)
ANION GAP SERPL CALC-SCNC: 15 MMOL/L (ref 7–16)
ANION GAP SERPL CALC-SCNC: 16 MMOL/L (ref 7–16)
ANION GAP SERPL CALC-SCNC: 16 MMOL/L (ref 7–16)
ANION GAP SERPL CALC-SCNC: 20 MMOL/L (ref 7–16)
APTT BLD: 25 SECONDS (ref 25–31.3)
APTT BLD: 25.4 SECONDS (ref 25–31.3)
AST SERPL-CCNC: 297 U/L (ref 15–37)
BASOPHILS NFR BLD AUTO: 0.1 %
BE: -3 MMOL/L
BE: -4.4 MMOL/L
BE: -8.6 MMOL/L
BILIRUB SERPL-MCNC: 0.5 MG/DL
BUN SERPL-MCNC: 32 MG/DL (ref 7–18)
BUN SERPL-MCNC: 32 MG/DL (ref 7–18)
BUN SERPL-MCNC: 33 MG/DL (ref 7–18)
BUN SERPL-MCNC: 36 MG/DL (ref 7–18)
BUN SERPL-MCNC: 41 MG/DL (ref 7–18)
CALCIUM SERPL-MCNC: 5.5 MG/DL (ref 8.5–10.1)
CALCIUM SERPL-MCNC: 5.8 MG/DL (ref 8.5–10.1)
CALCIUM SERPL-MCNC: 5.9 MG/DL (ref 8.5–10.1)
CALCIUM SERPL-MCNC: 5.9 MG/DL (ref 8.5–10.1)
CALCIUM SERPL-MCNC: 6 MG/DL (ref 8.5–10.1)
CHLORIDE SERPL-SCNC: 103 MMOL/L (ref 98–107)
CHLORIDE SERPL-SCNC: 104 MMOL/L (ref 98–107)
CHLORIDE SERPL-SCNC: 105 MMOL/L (ref 98–107)
CO2 SERPL-SCNC: 18 MMOL/L (ref 21–32)
CO2 SERPL-SCNC: 20 MMOL/L (ref 21–32)
CO2 SERPL-SCNC: 21 MMOL/L (ref 21–32)
CO2 SERPL-SCNC: 23 MMOL/L (ref 21–32)
CO2 SERPL-SCNC: 25 MMOL/L (ref 21–32)
CREAT SERPL-MCNC: 2.9 MG/DL (ref 0.6–1.3)
CREAT SERPL-MCNC: 3.4 MG/DL (ref 0.6–1.3)
CREAT SERPL-MCNC: 4 MG/DL (ref 0.6–1.3)
EOSINOPHIL NFR BLD: 0 %
FIBRINOGEN PPP-MCNC: 276 MG/DL (ref 200–340)
GLUCOSE SERPL-MCNC: 136 MG/DL (ref 70–99)
GLUCOSE SERPL-MCNC: 237 MG/DL (ref 70–99)
GLUCOSE SERPL-MCNC: 363 MG/DL (ref 70–99)
GLUCOSE SERPL-MCNC: 372 MG/DL (ref 70–99)
GLUCOSE SERPL-MCNC: 379 MG/DL (ref 70–99)
GRANULOCYTES NFR BLD MANUAL: 92.2 %
HCT VFR BLD CALC: 46.9 % (ref 42–52)
HGB BLD-MCNC: 16.2 GM/DL (ref 14–18)
INR PPP: 1.2
LYMPHOCYTES # BLD: 0.4 THOU/UL (ref 0.8–5.3)
LYMPHOCYTES NFR BLD AUTO: 4.5 %
MAGNESIUM SERPL-MCNC: 1.6 MG/DL (ref 1.8–2.4)
MAGNESIUM SERPL-MCNC: 1.7 MG/DL (ref 1.8–2.4)
MAGNESIUM SERPL-MCNC: 2.1 MG/DL (ref 1.8–2.4)
MCH RBC QN AUTO: 30.9 PG (ref 26–34)
MCHC RBC AUTO-ENTMCNC: 34.4 G/DL (ref 28–37)
MCV RBC: 89.9 FL (ref 80–100)
MONOCYTES NFR BLD: 3.2 %
MPV: 7.5 FL. (ref 7.2–11.1)
NEUTROPHILS # BLD: 9.3 THOU/UL (ref 1.6–8.1)
NT-PRO BRAIN NAT PEPTIDE: 261 PG/ML (ref ?–300)
NUCLEATED RBCS: 0 /100WBC
PCO2 BLD: 39.9 MMHG (ref 35–45)
PCO2 BLD: 46.1 MMHG (ref 35–45)
PCO2 BLD: 46.1 MMHG (ref 35–45)
PHOSPHATE SERPL-MCNC: 2.8 MG/DL (ref 2.5–4.9)
PLATELET COUNT*: 167 THOU/UL (ref 150–400)
PO2 BLD: 68.1 MMHG (ref 75–100)
PO2 BLD: 70.5 MMHG (ref 75–100)
PO2 BLD: 70.7 MMHG (ref 75–100)
POTASSIUM SERPL-SCNC: 2.8 MMOL/L (ref 3.5–5.1)
POTASSIUM SERPL-SCNC: 2.9 MMOL/L (ref 3.5–5.1)
POTASSIUM SERPL-SCNC: 2.9 MMOL/L (ref 3.5–5.1)
POTASSIUM SERPL-SCNC: 4 MMOL/L (ref 3.5–5.1)
POTASSIUM SERPL-SCNC: 4 MMOL/L (ref 3.5–5.1)
PROT SERPL-MCNC: 5.5 G/DL (ref 6.4–8.2)
PROTHROMBIN TIME: 12.7 SECONDS (ref 9.2–11.5)
RBC # BLD AUTO: 5.22 MIL/UL (ref 4.5–6)
RDW-CV: 14.6 % (ref 10.5–14.5)
SODIUM SERPL-SCNC: 140 MMOL/L (ref 136–145)
SODIUM SERPL-SCNC: 140 MMOL/L (ref 136–145)
SODIUM SERPL-SCNC: 141 MMOL/L (ref 136–145)
SODIUM SERPL-SCNC: 141 MMOL/L (ref 136–145)
SODIUM SERPL-SCNC: 142 MMOL/L (ref 136–145)
WBC # BLD AUTO: 10.1 THOU/UL (ref 4–11)

## 2020-08-03 NOTE — CON
[address]  [address]                    CONSULTATION                  
_______________________________________________________________________________
 
Name:       ALFREDA VELA         Room:           [address]    ADM [place]  
M.R.#:  [id number]      Account #:      [id number]  
Admission:  08/02/20     Attend Phys:    Hillary Love MD 
Discharge:               Date of Birth:  [birth year]  
         Report #: [id number]
                                                                     [id number]  
_______________________________________________________________________________
THIS REPORT FOR:  //name//                      
 
cc:  Jam Chaparro MD, Khanh MD                                                      ~
THIS REPORT FOR:  //name//                      
 
CC: Jam Love
 
DATE OF SERVICE:  08/02/2020
 
 
CARDIOLOGY CONSULTATION
 
INDICATION:  Out of hospital arrest.
 
HISTORY OF PRESENT ILLNESS:  The patient is a 50-year-old gentleman who was
found down at 2330 yesterday.  Upon EMS arrival, he was found to be in some sort
of arrhythmia requiring 4 shocks at which time spontaneous rhythm and
circulation was restored.  There is a history of polysubstance abuse.  Etiology
of cardiac arrest is unknown.  He has been admitted and placed on Code Ice
protocol.  There was concern of aspiration at the time of intubation.
 
PAST MEDICAL HISTORY:  Not available.
 
HOME MEDICATIONS:  List not available.
 
SOCIAL HISTORY:  Not available.
 
FAMILY HISTORY:  Unknown.
 
PHYSICAL EXAMINATION:
GENERAL:  He is presently hemodynamically stable on a combination of epinephrine
and Levophed.  He is in sinus rhythm.  This is a morbidly obese gentleman who is
intubated and unresponsive.
HEENT:  Head is normocephalic, atraumatic.
NECK:  Thick without obvious jugular venous distention.
CHEST:  Has diffuse wheezes throughout breath sounds, otherwise adequate.
CARDIOVASCULAR:  Reveals a regular rhythm with normal S1 and S2.  I do not
appreciate gallop or murmur.
ABDOMEN:  Reveals a protuberant abdomen that appears soft.
EXTREMITIES:  Shows no edema.
SKIN:  Dry.
 
LABORATORY DATA:  A 12-lead EKG shows sinus rhythm with some diffuse ST segment
 
 
 
[place] [address]                    CONSULTATION                  
_______________________________________________________________________________
 
Name:       ALFREDA VELA         Room:           [address]#:  [id number]      Account #:      [id number]  
Admission:  08/02/20     Attend Phys:    Hillary Love MD 
Discharge:               Date of Birth:  [birth year]  
         Report #: [id number]
                                                                     [id number]  
_______________________________________________________________________________
depression.  No pathologic Q-waves noted.  Chest x-ray shows minimal bilateral
opacities.  Initial troponin was less than 0.06, followed by 0.55 and now 0.84. 
NT-proBNP is normal at 53.
 
IMPRESSION AND RECOMMENDATIONS:
1.  Out of hospital arrest, status post resuscitation.  Continue supportive care
at this time.  At this time, his blood pressure has improved to the point where
vasopressors can be titrated.  He is on Code ICE protocol, which will be
completed.  An echocardiogram has been ordered and is pending.  Etiology of his
arrest is not well known.  Certainly, polysubstance use could be contributing.
2.  Respiratory arrest.  Presently intubated.  Pulmonology following.  The
patient's respiratory status appears stable at this time.
3.  Elevated troponin, likely due to strain from cardiac arrest.  The numbers
are still very small.  I would recommend an echocardiogram to evaluate
underlying cardiac structure and function.  Could consider outpatient stress
testing in the event of recovery.
 
 
 
 
 
 
 
 
 
 
 
 
 
 
 
 
 
 
 
 
 
 
 
 
 
 
 
 
<ELECTRONICALLY SIGNED>
                                        By:  Andrea Kumar MD, FACC   
08/03/20     1636
D: 08/02/20 1116_______________________________________
T: 08/02/20 1130Micedgardo Kumar MD, FACC      /nt

## 2020-08-03 NOTE — NUR
TARGET TEMP 98.6 REACHED AT 1630, MAINTAINING TEMP FROM 98.1 TO 98.4. GCS 3.
MTN AND FAMILY UPDATED. LEVOPHED TITRATED PER PROTOCOL, CURRENTLY AT 25
MCG/MIN. NIMBEX AND VERSED TO BE CONTD OVERNIGHT PER DR RODRÍGUEZ. IV DRIP
CHANGED TO NS  MLS/HR. ELECTROLYTES REPLACED PER ORDERS. OG AT LIS. UOP
130 MLS. SMALL SMEAR OF BM THIS SHIFT. Q2 TURNS AND ORAL CARE GIVEN.

## 2020-08-03 NOTE — NUR
SPOKE TO DR RODRÍGUEZ REGARDING AM LABS, ORDERS RECEIVED TO CON'T K PROTOCOL AND
GIVE ADDT'L 2GM MAGNESIUM NOW. INFORMED HIM OF PT INSULIN RESISTENCE AND HIGH
DOSING TONIGHT. ORDER TO CALL RENAL FOR ASSISTANCE WITH LABS. CALLED PLACED
RIGHT AFTR SPEAKING TO SALLY TO DR BORGES OFFICE

## 2020-08-03 NOTE — NUR
ICU rounds: Pt was a code ice yesterday, rewarming now, up to 97 degrees. Pt
is intubated and sedated. On pressors. Levo at 25. On insulin gtt. No weaning
trials planned as of yet. Per nurse, Pt was having dinner with his 11 year old
son, passed out, son called his mom (Pt's ex wife) and she called 911. Per
nurse, sister informed that Pt has a hx of opiate abuse. CM left message for
Pt's sister, awaiting call back. Following.

## 2020-08-04 VITALS — DIASTOLIC BLOOD PRESSURE: 57 MMHG | SYSTOLIC BLOOD PRESSURE: 112 MMHG

## 2020-08-04 VITALS — SYSTOLIC BLOOD PRESSURE: 109 MMHG | DIASTOLIC BLOOD PRESSURE: 55 MMHG

## 2020-08-04 VITALS — SYSTOLIC BLOOD PRESSURE: 109 MMHG | DIASTOLIC BLOOD PRESSURE: 51 MMHG

## 2020-08-04 VITALS — DIASTOLIC BLOOD PRESSURE: 52 MMHG | SYSTOLIC BLOOD PRESSURE: 109 MMHG

## 2020-08-04 VITALS — SYSTOLIC BLOOD PRESSURE: 110 MMHG | DIASTOLIC BLOOD PRESSURE: 50 MMHG

## 2020-08-04 VITALS — SYSTOLIC BLOOD PRESSURE: 100 MMHG | DIASTOLIC BLOOD PRESSURE: 51 MMHG

## 2020-08-04 VITALS — SYSTOLIC BLOOD PRESSURE: 107 MMHG | DIASTOLIC BLOOD PRESSURE: 52 MMHG

## 2020-08-04 VITALS — SYSTOLIC BLOOD PRESSURE: 103 MMHG | DIASTOLIC BLOOD PRESSURE: 44 MMHG

## 2020-08-04 VITALS — DIASTOLIC BLOOD PRESSURE: 50 MMHG | SYSTOLIC BLOOD PRESSURE: 104 MMHG

## 2020-08-04 VITALS — SYSTOLIC BLOOD PRESSURE: 112 MMHG | DIASTOLIC BLOOD PRESSURE: 52 MMHG

## 2020-08-04 VITALS — DIASTOLIC BLOOD PRESSURE: 63 MMHG | SYSTOLIC BLOOD PRESSURE: 121 MMHG

## 2020-08-04 VITALS — DIASTOLIC BLOOD PRESSURE: 46 MMHG | SYSTOLIC BLOOD PRESSURE: 95 MMHG

## 2020-08-04 VITALS — DIASTOLIC BLOOD PRESSURE: 64 MMHG | SYSTOLIC BLOOD PRESSURE: 121 MMHG

## 2020-08-04 VITALS — DIASTOLIC BLOOD PRESSURE: 60 MMHG | SYSTOLIC BLOOD PRESSURE: 119 MMHG

## 2020-08-04 VITALS — SYSTOLIC BLOOD PRESSURE: 110 MMHG | DIASTOLIC BLOOD PRESSURE: 52 MMHG

## 2020-08-04 VITALS — SYSTOLIC BLOOD PRESSURE: 103 MMHG | DIASTOLIC BLOOD PRESSURE: 49 MMHG

## 2020-08-04 VITALS — SYSTOLIC BLOOD PRESSURE: 101 MMHG | DIASTOLIC BLOOD PRESSURE: 51 MMHG

## 2020-08-04 VITALS — DIASTOLIC BLOOD PRESSURE: 61 MMHG | SYSTOLIC BLOOD PRESSURE: 116 MMHG

## 2020-08-04 VITALS — SYSTOLIC BLOOD PRESSURE: 114 MMHG | DIASTOLIC BLOOD PRESSURE: 59 MMHG

## 2020-08-04 VITALS — DIASTOLIC BLOOD PRESSURE: 54 MMHG | SYSTOLIC BLOOD PRESSURE: 111 MMHG

## 2020-08-04 VITALS — SYSTOLIC BLOOD PRESSURE: 112 MMHG | DIASTOLIC BLOOD PRESSURE: 55 MMHG

## 2020-08-04 VITALS — SYSTOLIC BLOOD PRESSURE: 123 MMHG | DIASTOLIC BLOOD PRESSURE: 65 MMHG

## 2020-08-04 VITALS — DIASTOLIC BLOOD PRESSURE: 51 MMHG | SYSTOLIC BLOOD PRESSURE: 109 MMHG

## 2020-08-04 VITALS — DIASTOLIC BLOOD PRESSURE: 49 MMHG | SYSTOLIC BLOOD PRESSURE: 106 MMHG

## 2020-08-04 VITALS — SYSTOLIC BLOOD PRESSURE: 112 MMHG | DIASTOLIC BLOOD PRESSURE: 53 MMHG

## 2020-08-04 VITALS — DIASTOLIC BLOOD PRESSURE: 52 MMHG | SYSTOLIC BLOOD PRESSURE: 113 MMHG

## 2020-08-04 VITALS — DIASTOLIC BLOOD PRESSURE: 55 MMHG | SYSTOLIC BLOOD PRESSURE: 110 MMHG

## 2020-08-04 VITALS — DIASTOLIC BLOOD PRESSURE: 56 MMHG | SYSTOLIC BLOOD PRESSURE: 111 MMHG

## 2020-08-04 VITALS — DIASTOLIC BLOOD PRESSURE: 53 MMHG | SYSTOLIC BLOOD PRESSURE: 106 MMHG

## 2020-08-04 VITALS — SYSTOLIC BLOOD PRESSURE: 120 MMHG | DIASTOLIC BLOOD PRESSURE: 60 MMHG

## 2020-08-04 VITALS — SYSTOLIC BLOOD PRESSURE: 112 MMHG | DIASTOLIC BLOOD PRESSURE: 54 MMHG

## 2020-08-04 VITALS — SYSTOLIC BLOOD PRESSURE: 104 MMHG | DIASTOLIC BLOOD PRESSURE: 50 MMHG

## 2020-08-04 VITALS — DIASTOLIC BLOOD PRESSURE: 59 MMHG | SYSTOLIC BLOOD PRESSURE: 119 MMHG

## 2020-08-04 VITALS — DIASTOLIC BLOOD PRESSURE: 57 MMHG | SYSTOLIC BLOOD PRESSURE: 115 MMHG

## 2020-08-04 VITALS — DIASTOLIC BLOOD PRESSURE: 55 MMHG | SYSTOLIC BLOOD PRESSURE: 109 MMHG

## 2020-08-04 VITALS — SYSTOLIC BLOOD PRESSURE: 100 MMHG | DIASTOLIC BLOOD PRESSURE: 43 MMHG

## 2020-08-04 VITALS — SYSTOLIC BLOOD PRESSURE: 119 MMHG | DIASTOLIC BLOOD PRESSURE: 64 MMHG

## 2020-08-04 VITALS — SYSTOLIC BLOOD PRESSURE: 103 MMHG | DIASTOLIC BLOOD PRESSURE: 52 MMHG

## 2020-08-04 VITALS — DIASTOLIC BLOOD PRESSURE: 56 MMHG | SYSTOLIC BLOOD PRESSURE: 113 MMHG

## 2020-08-04 VITALS — SYSTOLIC BLOOD PRESSURE: 118 MMHG | DIASTOLIC BLOOD PRESSURE: 59 MMHG

## 2020-08-04 VITALS — SYSTOLIC BLOOD PRESSURE: 108 MMHG | DIASTOLIC BLOOD PRESSURE: 51 MMHG

## 2020-08-04 VITALS — SYSTOLIC BLOOD PRESSURE: 106 MMHG | DIASTOLIC BLOOD PRESSURE: 54 MMHG

## 2020-08-04 VITALS — SYSTOLIC BLOOD PRESSURE: 106 MMHG | DIASTOLIC BLOOD PRESSURE: 56 MMHG

## 2020-08-04 VITALS — DIASTOLIC BLOOD PRESSURE: 51 MMHG | SYSTOLIC BLOOD PRESSURE: 94 MMHG

## 2020-08-04 VITALS — DIASTOLIC BLOOD PRESSURE: 57 MMHG | SYSTOLIC BLOOD PRESSURE: 117 MMHG

## 2020-08-04 VITALS — DIASTOLIC BLOOD PRESSURE: 46 MMHG | SYSTOLIC BLOOD PRESSURE: 102 MMHG

## 2020-08-04 VITALS — DIASTOLIC BLOOD PRESSURE: 44 MMHG | SYSTOLIC BLOOD PRESSURE: 105 MMHG

## 2020-08-04 VITALS — SYSTOLIC BLOOD PRESSURE: 98 MMHG | DIASTOLIC BLOOD PRESSURE: 49 MMHG

## 2020-08-04 VITALS — DIASTOLIC BLOOD PRESSURE: 41 MMHG | SYSTOLIC BLOOD PRESSURE: 98 MMHG

## 2020-08-04 VITALS — DIASTOLIC BLOOD PRESSURE: 51 MMHG | SYSTOLIC BLOOD PRESSURE: 111 MMHG

## 2020-08-04 VITALS — SYSTOLIC BLOOD PRESSURE: 117 MMHG | DIASTOLIC BLOOD PRESSURE: 54 MMHG

## 2020-08-04 VITALS — DIASTOLIC BLOOD PRESSURE: 53 MMHG | SYSTOLIC BLOOD PRESSURE: 104 MMHG

## 2020-08-04 VITALS — SYSTOLIC BLOOD PRESSURE: 107 MMHG | DIASTOLIC BLOOD PRESSURE: 55 MMHG

## 2020-08-04 VITALS — DIASTOLIC BLOOD PRESSURE: 57 MMHG | SYSTOLIC BLOOD PRESSURE: 116 MMHG

## 2020-08-04 VITALS — DIASTOLIC BLOOD PRESSURE: 51 MMHG | SYSTOLIC BLOOD PRESSURE: 105 MMHG

## 2020-08-04 VITALS — DIASTOLIC BLOOD PRESSURE: 52 MMHG | SYSTOLIC BLOOD PRESSURE: 111 MMHG

## 2020-08-04 VITALS — DIASTOLIC BLOOD PRESSURE: 55 MMHG | SYSTOLIC BLOOD PRESSURE: 108 MMHG

## 2020-08-04 VITALS — SYSTOLIC BLOOD PRESSURE: 108 MMHG | DIASTOLIC BLOOD PRESSURE: 53 MMHG

## 2020-08-04 VITALS — SYSTOLIC BLOOD PRESSURE: 92 MMHG | DIASTOLIC BLOOD PRESSURE: 47 MMHG

## 2020-08-04 VITALS — DIASTOLIC BLOOD PRESSURE: 58 MMHG | SYSTOLIC BLOOD PRESSURE: 117 MMHG

## 2020-08-04 VITALS — DIASTOLIC BLOOD PRESSURE: 61 MMHG | SYSTOLIC BLOOD PRESSURE: 111 MMHG

## 2020-08-04 VITALS — DIASTOLIC BLOOD PRESSURE: 50 MMHG | SYSTOLIC BLOOD PRESSURE: 102 MMHG

## 2020-08-04 VITALS — SYSTOLIC BLOOD PRESSURE: 111 MMHG | DIASTOLIC BLOOD PRESSURE: 52 MMHG

## 2020-08-04 VITALS — SYSTOLIC BLOOD PRESSURE: 100 MMHG | DIASTOLIC BLOOD PRESSURE: 52 MMHG

## 2020-08-04 LAB
ABSOLUTE BASOPHILS: 0 THOU/UL (ref 0–0.2)
ABSOLUTE EOSINOPHILS: 0 THOU/UL (ref 0–0.7)
ABSOLUTE MONOCYTES: 0.6 THOU/UL (ref 0–1.2)
ALBUMIN SERPL-MCNC: 2.6 G/DL (ref 3.4–5)
ALP SERPL-CCNC: 54 U/L (ref 46–116)
ALT SERPL-CCNC: 76 U/L (ref 30–65)
ANION GAP SERPL CALC-SCNC: 14 MMOL/L (ref 7–16)
ANION GAP SERPL CALC-SCNC: 16 MMOL/L (ref 7–16)
AST SERPL-CCNC: 172 U/L (ref 15–37)
BASOPHILS NFR BLD AUTO: 0.1 %
BE: -8.6 MMOL/L
BE: -9 MMOL/L
BILIRUB SERPL-MCNC: 0.4 MG/DL
BUN SERPL-MCNC: 47 MG/DL (ref 7–18)
BUN SERPL-MCNC: 53 MG/DL (ref 7–18)
CALCIUM SERPL-MCNC: 5.9 MG/DL (ref 8.5–10.1)
CALCIUM SERPL-MCNC: 6.1 MG/DL (ref 8.5–10.1)
CHLORIDE SERPL-SCNC: 100 MMOL/L (ref 98–107)
CHLORIDE SERPL-SCNC: 99 MMOL/L (ref 98–107)
CO2 SERPL-SCNC: 21 MMOL/L (ref 21–32)
CO2 SERPL-SCNC: 23 MMOL/L (ref 21–32)
CREAT SERPL-MCNC: 4.7 MG/DL (ref 0.6–1.3)
CREAT SERPL-MCNC: 5.2 MG/DL (ref 0.6–1.3)
EOSINOPHIL NFR BLD: 0 %
GLUCOSE SERPL-MCNC: 330 MG/DL (ref 70–99)
GLUCOSE SERPL-MCNC: 391 MG/DL (ref 70–99)
GRANULOCYTES NFR BLD MANUAL: 92.9 %
HCT VFR BLD CALC: 35.1 % (ref 42–52)
HCT VFR BLD CALC: 35.5 % (ref 42–52)
HGB BLD-MCNC: 12.1 GM/DL (ref 14–18)
HGB BLD-MCNC: 12.3 GM/DL (ref 14–18)
LYMPHOCYTES # BLD: 0.6 THOU/UL (ref 0.8–5.3)
LYMPHOCYTES NFR BLD AUTO: 3.6 %
MAGNESIUM SERPL-MCNC: 1.9 MG/DL (ref 1.8–2.4)
MAGNESIUM SERPL-MCNC: 2 MG/DL (ref 1.8–2.4)
MCH RBC QN AUTO: 30.6 PG (ref 26–34)
MCH RBC QN AUTO: 31 PG (ref 26–34)
MCHC RBC AUTO-ENTMCNC: 34.4 G/DL (ref 28–37)
MCHC RBC AUTO-ENTMCNC: 34.8 G/DL (ref 28–37)
MCV RBC: 88.9 FL (ref 80–100)
MCV RBC: 89.1 FL (ref 80–100)
MONOCYTES NFR BLD: 3.4 %
MPV: 9 FL. (ref 7.2–11.1)
MPV: 9.2 FL. (ref 7.2–11.1)
NEUTROPHILS # BLD: 15.7 THOU/UL (ref 1.6–8.1)
NUCLEATED RBCS: 0 /100WBC
PCO2 BLD: 40.8 MMHG (ref 35–45)
PCO2 BLD: 49.6 MMHG (ref 35–45)
PLATELET COUNT*: 73 THOU/UL (ref 150–400)
PLATELET COUNT*: 75 THOU/UL (ref 150–400)
PO2 BLD: 80.4 MMHG (ref 75–100)
PO2 BLD: 87 MMHG (ref 75–100)
POTASSIUM SERPL-SCNC: 4.9 MMOL/L (ref 3.5–5.1)
POTASSIUM SERPL-SCNC: 5.2 MMOL/L (ref 3.5–5.1)
PROT SERPL-MCNC: 5.1 G/DL (ref 6.4–8.2)
RBC # BLD AUTO: 3.94 MIL/UL (ref 4.5–6)
RBC # BLD AUTO: 3.99 MIL/UL (ref 4.5–6)
RDW-CV: 14.6 % (ref 10.5–14.5)
RDW-CV: 14.7 % (ref 10.5–14.5)
SODIUM SERPL-SCNC: 136 MMOL/L (ref 136–145)
SODIUM SERPL-SCNC: 137 MMOL/L (ref 136–145)
WBC # BLD AUTO: 16.5 THOU/UL (ref 4–11)
WBC # BLD AUTO: 16.9 THOU/UL (ref 4–11)

## 2020-08-04 NOTE — CON
[address]  [address]                    CONSULTATION                  
_______________________________________________________________________________
 
Name:       ALFREDA VELA         Room:           [address] [place]  
M.R.#:  [id number]      Account #:      [id number]  
Admission:  08/02/20     Attend Phys:    Hillary Love MD 
Discharge:               Date of Birth:  [birth year]  
         Report #: [id number]
                                                                     [id number]  
_______________________________________________________________________________
THIS REPORT FOR:  //name//                      
 
cc:  Jam Chaparro MD, Khanh MD                                                      ~
THIS REPORT FOR:  //name//                      
 
CC: Jam Love
 
DATE OF SERVICE:  08/02/2020
 
 
CONSULT REQUESTED BY:  Rodrick Dunbar MD
 
INDICATION FOR CONSULTATION:  Acute hypoxemic and hypercarbic respiratory
failure post-cardiac arrest.
 
HISTORY OF PRESENT ILLNESS:  A 50-year-old gentleman.  He is post-cardiac arrest
and at this time is not responsive and therefore detailed information is not
available.  The patient is reported to have an outside cardiac arrest and had
been shocked 4 times in field.  The exact duration of time the patient was
unresponsive is not known.  The patient is reported to have been found
unresponsive at home at 2330 hours yesterday.  The patient had been vomiting. 
He is reported to have had a history of illegal drug use.  I do not have details
available.  He had a bottle of pills with him.  He, after being shocked 4 times
in the field, did have return of spontaneous circulation.  The patient is
reported to have had used an arthritis cream recently, which contains ketamine.
 
Upon arrival to the ER, the patient had a Robi's tube.  He had been vomiting and
it was obvious that the patient has aspirated vomitus.  He was switched over to
an endotracheal tube and had a central line placed in the Emergency Room.  The
patient has been significantly hypotensive, although he has been bolused with 4
liters of fluid and has IV fluids running at 150 an hour.  The patient in
addition to being on maximal doses of norepinephrine also is on low dose
epinephrine to maintain blood pressure.  The patient has been on assist control
mode of ventilation with a rate of 28 and a tidal volume of 450 overnight.  The
patient is unable to provide a further history or review of systems.
 
PAST MEDICAL HISTORY:  Morbid obesity.  Further details not available.
 
HOME MEDICATIONS:  He is reported to have used Ambien at home.  Further details
not available.
 
SOCIAL HISTORY:  He has a history of illegal drug use.  Further details not
available.
 
 
 
 
[place] [address]                    CONSULTATION                  
_______________________________________________________________________________
 
Name:       ALFREDA VELA         Room:           [address] [place]  
[facility]#:  [id number]      Account #:      [id number]  
Admission:  08/02/20     Attend Phys:    Hillary Love MD 
Discharge:               Date of Birth:  [birth year]  
         Report #: [id number]
                                                                     [id number]  
_______________________________________________________________________________
FAMILY HISTORY:  Unknown.
 
ALLERGIES:  Unknown.
 
PHYSICAL EXAMINATION:
GENERAL:  The patient was overbreathing the ventilator at times, other than
this, I did not see any response prior to giving paralytic as below.  He is on
high doses of pressors as above.
VITAL SIGNS:  Has a pulse of 80 and a blood pressure of 124/50.  He is
oxygenating well at 98%.  He is on 90% FiO2, tidal volume 450, AC rate 28, FiO2
is 90%.  He is afebrile.
HEENT:  Head is  normocephalic and atraumatic.  Endotracheal tube is in good
position.
NECK:  Does not show raised JVP, asymmetry, mass or lymph nodes.
CHEST:  Symmetrical expansion on inspection and palpation.  On auscultation,
there are loud inspiratory as well as expiratory wheezes heard.  The patient is
severely bronchospastic.
HEART:  Regular.  There is no murmur.
ABDOMEN:  Significantly distended.  There is no tenderness.  There is a small
amount of blood present in the OG.  There has been high OG output.  There is no
obvious tenderness of the abdomen.
EXTREMITIES:  Lower extremities show no edema, no calf tenderness.
SKIN:  Dry and intact.
NEUROLOGICAL:  He is completely unresponsive.
 
LABORATORY DATA:  The patient's chest x-ray is reviewed.  I repeated a stat
chest x-ray now as well.  I do not see any large infiltrates.  There are small
opacities bilaterally, which are consistent with some aspiration.  There is no
increase in pulmonary vascular congestion.  Endotracheal tube is in good
position.  The patient's arterial blood gases showing a significant respiratory
as well as metabolic acidosis in [facility] reviewed.  The patient's lab work,
which shows a creatinine elevated to 1.9 consistent with acute renal failure,
reviewed.  The patient's blood glucose was 406 this morning.  The patient's CBC
is in [facility] reviewed.  The patient's COVID-19 antigen is negative, but PCR is
pending.
 
ASSESSMENT AND PLAN:
1.  Cardiac arrest, etiology of the patient's cardiac arrest remains to be fully
defined at this time.  He is, however, positive on benzodiazepines, marijuana as
well as opiates on admission.  It is, however, possible that the patient was
administered some benzodiazepine and opiates prior to this being drawn, unknown
to me as to whether the use of ketamine containing cream may have played a role.
2.  Acute hypoxemic and hypercarbic respiratory failure.  I switched him over to
pressure control with this, he has a higher tidal volume.  We will see if this
leads to a favorable response.  We will start him on some sedation to be able to
ventilate him better.   Obviously he is otherwise unresponsive, but this is to
 
 
 
[address]  [address]                    CONSULTATION                  
_______________________________________________________________________________
 
Name:       ALFREDA VELA         Room:           [address] [place]  
M.R.#:  [id number]      Account #:      [id number]  
Admission:  08/02/20     Attend Phys:    Hillary Love MD 
Discharge:               Date of Birth:  [birth year]  
         Report #: [id number]
                                                                     [id number]  
_______________________________________________________________________________
be able to ventilate him.  I gave him 2 doses of vecuronium as well.  I did not
give him sedation before giving him vecuronium as he is unresponsive.  It is
possible that he needs to be started on a paralytic infusion.  We will follow
and advise.
3.  Severe acute bronchospasm.  High dose Solu-Medrol as well as bronchodilators
are ordered via nebulizer.
4.  Aspiration pneumonitis.  I do not see any large infiltrates in the patient's
chest x-ray, but he is obviously aspirated.  We will go ahead and start him on
Zosyn.  We will also do more cultures and serologies.
5.  Acute renal failure with metabolic as well as respiratory acidosis.  I
intend to start him on a bicarbonate infusion.  I did order an amp of
bicarbonate to be pushed and the patient has had only minimal urine output.  I
intend to continue with fluids.  If he remains hard to ventilate, then I will
paralyze him as above.  Nephrology service has also been consulted.  We will
bolus him with albumin.
6.  Upper gastrointestinal bleed.  He does have a small amount of blood in his
OG.  I would start him on Protonix for now, I discontinued his subcutaneous
heparin, but will reassess and perhaps be start subcutaneous heparin if he
remains stable tomorrow.
7.  Anoxic brain injury.  This will need to be evaluated further later.
8.  Abdominal distention.  His abdomen is significantly distended.  I am giving
him fentanyl in order to avoid further distention of the abdomen with fentanyl. 
I will go ahead and give him one dose of Relistor as well.
9.  COVID-19 screening.  This is in progress.  The antigen is negative.  PCR is
pending.
10.  Evaluation for thromboembolic phenomena.  We will also do venous Dopplers. 
We will plan on an echo later as well.  Obviously cannot do a CT chest as his
creatinine is elevated.
11.  Severe hyperglycemia.  Insulin drip.
 
The patient is critically ill at this time.  Total time spent providing critical
care to this patient exceeds 1 hour.
 
 
 
 
 
 
 
 
 
 
 
 
<ELECTRONICALLY SIGNED>
                                        By:  Kyle Coker MD              
08/04/20     1235
D: 08/02/20 0846_______________________________________
T: 08/02/20 0915Kyle Coker MD                 /nt

## 2020-08-04 NOTE — NUR
0730 ASSUMED CARE OF PATIENT. PLEASE SEE DOCUMENTED ASSESSMENT. WARMING PADS
REMOVED. PT REMAINS ON PARALYTIC

## 2020-08-04 NOTE — NUR
PATIENT IS NEEDING LESS SUPPORT OF BLOOD PRESSURE. HE IS ON PRESSURE CONTROL
VENTILATOR MODE. OLIGURIC. CT'S OF HEAD,CHEST, AND ABDOMEN COMPLETED. OFF ALL
SEDATION. SISTER HAS CALLED WANTING THOSE RESULTS. DIRECTED TO SPEAK WITH
PHYSICIANS TOMORROW. SHE IS AWARE THAT THE HEAD CT SHOWED EDEMA. EEG DONE AND
WILL BE REPEATED TOMORROW. MTN HERE THIS MORNING

## 2020-08-04 NOTE — NUR
FLUID BOLUS INITIATED AS ORDERED BY DR MENDES. ATTEMPTING TO COORDINATE CT
SCHEDULE WITH RT/CT/SUPERVISOR

## 2020-08-04 NOTE — NUR
ASSUMED CARE AT 1900H, ON VENT PRESSURE SUPPORT OF 35, FREQUENCY 26 AND FIO2
70% AND TOLERATED. PT WAS UNRESPONSIVE AND PUPILS AT 5MM FIXED. ON GOING
NIMBEX, VERSED, AND LEVO DRIP. INFORM NEPRO REGARDING CRITICAL RESULT WITH
ORDER MADE AND CARRIED OUT. PULMONULOGIST ORDERED NEW VENT SEETING. AC MODE,
, FREQUENCY 22, PEEP 5 AND FIO2 70%. NO DISTRESS NOTED. MAINTAINING
DESIRED TEMP, ARTICSUN STOP AND NO REWARMING OR RECOOLING DONE. UPDATE GIVEN
TO SISTER AND MTN. LEVO NOW AT 20MICS/MIN. CONTINUE MONITORING ANG TOWARD
GOALS.

## 2020-08-05 VITALS — DIASTOLIC BLOOD PRESSURE: 56 MMHG | SYSTOLIC BLOOD PRESSURE: 116 MMHG

## 2020-08-05 VITALS — DIASTOLIC BLOOD PRESSURE: 50 MMHG | SYSTOLIC BLOOD PRESSURE: 103 MMHG

## 2020-08-05 VITALS — DIASTOLIC BLOOD PRESSURE: 55 MMHG | SYSTOLIC BLOOD PRESSURE: 104 MMHG

## 2020-08-05 VITALS — SYSTOLIC BLOOD PRESSURE: 108 MMHG | DIASTOLIC BLOOD PRESSURE: 55 MMHG

## 2020-08-05 VITALS — SYSTOLIC BLOOD PRESSURE: 105 MMHG | DIASTOLIC BLOOD PRESSURE: 57 MMHG

## 2020-08-05 VITALS — SYSTOLIC BLOOD PRESSURE: 118 MMHG | DIASTOLIC BLOOD PRESSURE: 58 MMHG

## 2020-08-05 VITALS — DIASTOLIC BLOOD PRESSURE: 54 MMHG | SYSTOLIC BLOOD PRESSURE: 99 MMHG

## 2020-08-05 VITALS — SYSTOLIC BLOOD PRESSURE: 125 MMHG | DIASTOLIC BLOOD PRESSURE: 60 MMHG

## 2020-08-05 VITALS — SYSTOLIC BLOOD PRESSURE: 100 MMHG | DIASTOLIC BLOOD PRESSURE: 56 MMHG

## 2020-08-05 VITALS — DIASTOLIC BLOOD PRESSURE: 66 MMHG | SYSTOLIC BLOOD PRESSURE: 130 MMHG

## 2020-08-05 VITALS — SYSTOLIC BLOOD PRESSURE: 104 MMHG | DIASTOLIC BLOOD PRESSURE: 54 MMHG

## 2020-08-05 VITALS — SYSTOLIC BLOOD PRESSURE: 124 MMHG | DIASTOLIC BLOOD PRESSURE: 66 MMHG

## 2020-08-05 VITALS — SYSTOLIC BLOOD PRESSURE: 110 MMHG | DIASTOLIC BLOOD PRESSURE: 57 MMHG

## 2020-08-05 VITALS — DIASTOLIC BLOOD PRESSURE: 48 MMHG | SYSTOLIC BLOOD PRESSURE: 95 MMHG

## 2020-08-05 VITALS — DIASTOLIC BLOOD PRESSURE: 57 MMHG | SYSTOLIC BLOOD PRESSURE: 105 MMHG

## 2020-08-05 VITALS — DIASTOLIC BLOOD PRESSURE: 65 MMHG | SYSTOLIC BLOOD PRESSURE: 124 MMHG

## 2020-08-05 VITALS — SYSTOLIC BLOOD PRESSURE: 96 MMHG | DIASTOLIC BLOOD PRESSURE: 51 MMHG

## 2020-08-05 VITALS — SYSTOLIC BLOOD PRESSURE: 155 MMHG | DIASTOLIC BLOOD PRESSURE: 75 MMHG

## 2020-08-05 VITALS — SYSTOLIC BLOOD PRESSURE: 99 MMHG | DIASTOLIC BLOOD PRESSURE: 51 MMHG

## 2020-08-05 VITALS — DIASTOLIC BLOOD PRESSURE: 53 MMHG | SYSTOLIC BLOOD PRESSURE: 102 MMHG

## 2020-08-05 VITALS — SYSTOLIC BLOOD PRESSURE: 97 MMHG | DIASTOLIC BLOOD PRESSURE: 50 MMHG

## 2020-08-05 VITALS — SYSTOLIC BLOOD PRESSURE: 106 MMHG | DIASTOLIC BLOOD PRESSURE: 54 MMHG

## 2020-08-05 VITALS — DIASTOLIC BLOOD PRESSURE: 53 MMHG | SYSTOLIC BLOOD PRESSURE: 107 MMHG

## 2020-08-05 VITALS — SYSTOLIC BLOOD PRESSURE: 93 MMHG | DIASTOLIC BLOOD PRESSURE: 46 MMHG

## 2020-08-05 VITALS — DIASTOLIC BLOOD PRESSURE: 44 MMHG | SYSTOLIC BLOOD PRESSURE: 86 MMHG

## 2020-08-05 VITALS — SYSTOLIC BLOOD PRESSURE: 106 MMHG | DIASTOLIC BLOOD PRESSURE: 55 MMHG

## 2020-08-05 VITALS — DIASTOLIC BLOOD PRESSURE: 42 MMHG | SYSTOLIC BLOOD PRESSURE: 94 MMHG

## 2020-08-05 VITALS — SYSTOLIC BLOOD PRESSURE: 90 MMHG | DIASTOLIC BLOOD PRESSURE: 51 MMHG

## 2020-08-05 VITALS — DIASTOLIC BLOOD PRESSURE: 51 MMHG | SYSTOLIC BLOOD PRESSURE: 90 MMHG

## 2020-08-05 VITALS — SYSTOLIC BLOOD PRESSURE: 96 MMHG | DIASTOLIC BLOOD PRESSURE: 48 MMHG

## 2020-08-05 VITALS — DIASTOLIC BLOOD PRESSURE: 56 MMHG | SYSTOLIC BLOOD PRESSURE: 100 MMHG

## 2020-08-05 VITALS — DIASTOLIC BLOOD PRESSURE: 55 MMHG | SYSTOLIC BLOOD PRESSURE: 106 MMHG

## 2020-08-05 VITALS — DIASTOLIC BLOOD PRESSURE: 44 MMHG | SYSTOLIC BLOOD PRESSURE: 96 MMHG

## 2020-08-05 VITALS — DIASTOLIC BLOOD PRESSURE: 69 MMHG | SYSTOLIC BLOOD PRESSURE: 140 MMHG

## 2020-08-05 VITALS — SYSTOLIC BLOOD PRESSURE: 113 MMHG | DIASTOLIC BLOOD PRESSURE: 59 MMHG

## 2020-08-05 VITALS — SYSTOLIC BLOOD PRESSURE: 100 MMHG | DIASTOLIC BLOOD PRESSURE: 50 MMHG

## 2020-08-05 VITALS — SYSTOLIC BLOOD PRESSURE: 108 MMHG | DIASTOLIC BLOOD PRESSURE: 56 MMHG

## 2020-08-05 VITALS — DIASTOLIC BLOOD PRESSURE: 52 MMHG | SYSTOLIC BLOOD PRESSURE: 100 MMHG

## 2020-08-05 VITALS — SYSTOLIC BLOOD PRESSURE: 103 MMHG | DIASTOLIC BLOOD PRESSURE: 53 MMHG

## 2020-08-05 VITALS — SYSTOLIC BLOOD PRESSURE: 93 MMHG | DIASTOLIC BLOOD PRESSURE: 44 MMHG

## 2020-08-05 VITALS — DIASTOLIC BLOOD PRESSURE: 52 MMHG | SYSTOLIC BLOOD PRESSURE: 105 MMHG

## 2020-08-05 VITALS — DIASTOLIC BLOOD PRESSURE: 54 MMHG | SYSTOLIC BLOOD PRESSURE: 103 MMHG

## 2020-08-05 VITALS — DIASTOLIC BLOOD PRESSURE: 54 MMHG | SYSTOLIC BLOOD PRESSURE: 106 MMHG

## 2020-08-05 VITALS — DIASTOLIC BLOOD PRESSURE: 58 MMHG | SYSTOLIC BLOOD PRESSURE: 119 MMHG

## 2020-08-05 VITALS — SYSTOLIC BLOOD PRESSURE: 96 MMHG | DIASTOLIC BLOOD PRESSURE: 45 MMHG

## 2020-08-05 VITALS — DIASTOLIC BLOOD PRESSURE: 68 MMHG | SYSTOLIC BLOOD PRESSURE: 130 MMHG

## 2020-08-05 VITALS — DIASTOLIC BLOOD PRESSURE: 53 MMHG | SYSTOLIC BLOOD PRESSURE: 100 MMHG

## 2020-08-05 VITALS — SYSTOLIC BLOOD PRESSURE: 103 MMHG | DIASTOLIC BLOOD PRESSURE: 49 MMHG

## 2020-08-05 VITALS — DIASTOLIC BLOOD PRESSURE: 64 MMHG | SYSTOLIC BLOOD PRESSURE: 119 MMHG

## 2020-08-05 VITALS — SYSTOLIC BLOOD PRESSURE: 104 MMHG | DIASTOLIC BLOOD PRESSURE: 51 MMHG

## 2020-08-05 VITALS — SYSTOLIC BLOOD PRESSURE: 100 MMHG | DIASTOLIC BLOOD PRESSURE: 52 MMHG

## 2020-08-05 VITALS — DIASTOLIC BLOOD PRESSURE: 51 MMHG | SYSTOLIC BLOOD PRESSURE: 104 MMHG

## 2020-08-05 LAB
ABSOLUTE MONOCYTES: 0.1 THOU/UL (ref 0–1.2)
ALBUMIN SERPL-MCNC: 2.3 G/DL (ref 3.4–5)
ALP SERPL-CCNC: 66 U/L (ref 46–116)
ALT SERPL-CCNC: 57 U/L (ref 30–65)
ANION GAP SERPL CALC-SCNC: 15 MMOL/L (ref 7–16)
ANISOCYTOSIS BLD QL SMEAR: (no result)
AST SERPL-CCNC: 219 U/L (ref 15–37)
BE: -5.7 MMOL/L
BE: -7.3 MMOL/L
BILIRUB SERPL-MCNC: 0.3 MG/DL
BUN SERPL-MCNC: 67 MG/DL (ref 7–18)
CALCIUM SERPL-MCNC: 6.8 MG/DL (ref 8.5–10.1)
CHLORIDE SERPL-SCNC: 98 MMOL/L (ref 98–107)
CO2 SERPL-SCNC: 22 MMOL/L (ref 21–32)
CREAT SERPL-MCNC: 6.7 MG/DL (ref 0.6–1.3)
DOHLE BOD BLD QL SMEAR: (no result)
GLUCOSE SERPL-MCNC: 362 MG/DL (ref 70–99)
GRANULOCYTES NFR BLD MANUAL: 79 %
HCT VFR BLD CALC: 29.6 % (ref 42–52)
HGB BLD-MCNC: 10.2 GM/DL (ref 14–18)
LYMPHOCYTES # BLD: 0.4 THOU/UL (ref 0.8–5.3)
LYMPHOCYTES NFR BLD AUTO: 3 %
MAGNESIUM SERPL-MCNC: 2.1 MG/DL (ref 1.8–2.4)
MCH RBC QN AUTO: 30.7 PG (ref 26–34)
MCHC RBC AUTO-ENTMCNC: 34.6 G/DL (ref 28–37)
MCV RBC: 88.8 FL (ref 80–100)
MONOCYTES NFR BLD: 1 %
MPV: 9.1 FL. (ref 7.2–11.1)
NEUTROPHILS # BLD: 12.7 THOU/UL (ref 1.6–8.1)
NEUTS BAND NFR BLD: 17 %
NUCLEATED RBCS: 0 /100WBC
PCO2 BLD: 31.6 MMHG (ref 35–45)
PCO2 BLD: 37.7 MMHG (ref 35–45)
PLATELET # BLD EST: (no result) 10*3/UL
PLATELET COUNT*: 57 THOU/UL (ref 150–400)
PO2 BLD: 69 MMHG (ref 75–100)
PO2 BLD: 88.9 MMHG (ref 75–100)
POIKILOCYTOSIS BLD QL SMEAR: (no result)
POTASSIUM SERPL-SCNC: 4.8 MMOL/L (ref 3.5–5.1)
PREALB SERPL-MCNC: 14.1 MG/DL (ref 18–35.7)
PROT SERPL-MCNC: 5.1 G/DL (ref 6.4–8.2)
RBC # BLD AUTO: 3.33 MIL/UL (ref 4.5–6)
RDW-CV: 14.9 % (ref 10.5–14.5)
SODIUM SERPL-SCNC: 135 MMOL/L (ref 136–145)
WBC # BLD AUTO: 13.2 THOU/UL (ref 4–11)

## 2020-08-05 NOTE — NUR
ICU ROUNDS:
CM SPOKE TO THE RN IN-CHARGE OF THE PT AND SHE INFORMS THAT PER DR ANSARI PT IS
'BRAIN DEAD'. PT TO HAVE NUC MED SCAN OF THE BRAIN TODAY. DR RODRÍGUEZ TO
COMPLETE THE APNEA TEST WITH THE PT POSSIBLY TOMORROW.  PT'S FAMILY IS
INVOLVED. CM WILL REMAIN AVAILABLE TO ASSIST AND FOLLOW AS NEEDED.

## 2020-08-05 NOTE — NUR
NEUROLOGIC TESTS FOR BRAIN DEATH DONE BY DR MAZA AT THE BEDSIDE, CALORIC
REFLEX AND CORNEAL REFLEX ABSENT. APNEA TEST PENDING, DR RODRÍGUEZ TO DECIDE ON
THE TIME FOR APNEA TEST.

## 2020-08-05 NOTE — EEG
[address]  [address]                    EEG STUDY REPORT              
_______________________________________________________________________________
 
Name:       ALFREDA VELA         Room:           [address] [place]  
M.R.#:  [id number]      Account #:      [id number]  
Admission:  08/02/20     Attend Phys:    Hillary Love MD 
Discharge:  08/05/20     Date of Birth:  [birth year]  
         Report #: [id number]
                                                                     [id number]  
_______________________________________________________________________________
THIS REPORT FOR:  //name//                      
 
CC: Jam Love
 
DATE OF SERVICE:  08/05/2020
 
 
This patient has hypoxic encephalopathy.  This patient's EEG was started at 7
microvolts and then the sensitivity was changed to 2 microvolt, no cortical
activity is present at 7 microvolts.  At 2 microvolt, lot of artifact is
present, but no cortical activity appeared to be present.  Photic stimulation is
unremarkable.
 
IMPRESSION:  This patient's EEG does not appear to be showing any cortical
activity.  No electrical activity was noticed during this record consistent with
a brain death.  Clinical correlation is recommended.
 
 
 
 
 
 
 
 
 
 
 
 
 
 
 
 
 
 
 
 
 
 
 
 
 
 
 
<ELECTRONICALLY SIGNED>
                                        By:  Maxwlel Barnes MD         
08/05/20 2000
D: 08/05/20 1637_______________________________________
T: 08/05/20 1650Maxwell Barnes MD            /nt

## 2020-08-05 NOTE — NUR
ASSUMED CARE AT 1900H, ON VENT PRESSURE SUPPORT OF 35 AND FIO2 0F 70%. SEEN ON
BED UNRESPONSSIVE AND FIXED PUPILS. NO SEDATION AND ON LEVO DRIP, TITRATED.
PLAN FOR BRAIN DEATH TESTING TODAY. SISTER PAULY WILL VISIT PT TODAY. PT STILL
UNRESPONSSIVE AND VENT TOLERATED. NO BLEEDING AND NO FEVER NOTED. CONTINUE
MONITORING AND TOWARD GOALS.

## 2020-08-05 NOTE — CON
[address]  [address]                    CONSULTATION                  
_______________________________________________________________________________
 
Name:       ALFREDA VELA         Room:           [address] [place]  
M.R.#:  [id number]      Account #:      [id number]  
Admission:  08/02/20     Attend Phys:    Hillary Love MD 
Discharge:  08/05/20     Date of Birth:  [birth year]  
         Report #: [id number]
                                                                     [id number]  
_______________________________________________________________________________
THIS REPORT FOR:  //name//                      
 
cc:  Jam Chaparro MD, Khanh MD                                                      ~
THIS REPORT FOR:  //name//                      
 
CC: Jam Love
 
DATE OF SERVICE:  08/02/2020
 
 
HISTORY OF PRESENT ILLNESS:  This is a 50-year-old male patient who was
evaluated by me for hypoxic encephalopathy.  The patient is unable to provide
any history.  No family member is here and all of it is from the record.  It
looks like the patient was admitted with cardiac arrest which may have been
secondary to any process.  It looks like he aspirated.  He was vomiting.  There
is a question of illegal drug use.  He was shocked multiple times.  There was
some question of foreign body in the lung.
 
REVIEW OF SYSTEMS:  Only thing I can get is that this patient had a cardiac
arrest and he is on hypothermia protocol at the moment.  I do not have any
14-point review of system, which I attempted from.  It looks like the patient's
blood sugar was very high and is still is and I do not know what his diabetic
status is.  That is all the 14-point review of system is available.
 
PAST MEDICAL HISTORY:  Difficult to tell, but looks like he has diabetes.
 
FAMILY HISTORY:  Unavailable.
 
SOCIAL HISTORY:  Unavailable.
 
PHYSICAL EXAMINATION:  His examination is limited.  He is on sedation.  He is on
pressors.  He has no response including Babinski.  He did not move anything. 
His pupils were fixed and dilated, but that need to be considered in the light
of the fact that he is on pressor and they can dilate the pupil.  Respiratory
and cardiac examination is as per Cardiology and Pulmonary.
 
Temperature is 91.5, pulse is 70, blood pressure is 124/78. 
 
He did not have a CT scan of the head in the Emergency Room.
 
IMPRESSION:  The patient presents with encephalopathy.  He needs further workup.
 His CT was not done in the Emergency Room and need to get it done.  Presently,
he is on hypothermia protocol and is on pressors.  Whenever he comes off
hypothermia protocol, we will try to get it done if everybody feels comfortable
 
 
 
[place] [address]                    CONSULTATION                  
_______________________________________________________________________________
 
Name:       ALFREDA VELA         Room:           [address] [place]  
[facility].#:  [id number]      Account #:      [id number]  
Admission:  08/02/20     Attend Phys:    Hillary Love MD 
Discharge:  08/05/20     Date of Birth:  [birth year]  
         Report #: [id number]
                                                                     [id number]  
_______________________________________________________________________________
with that.  I will also get an EEG done.  Hopefully, we can reach the family in
the meantime.
 
Thank you very much for this referral.  We will follow this patient tomorrow and
try to arrange the workup.
 
 
 
 
 
 
 
 
 
 
 
 
 
 
 
 
 
 
 
 
 
 
 
 
 
 
 
 
 
 
 
 
 
 
 
 
 
 
 
<ELECTRONICALLY SIGNED>
                                        By:  Maxwell Barnes MD         
08/05/20 2000
D: 08/02/20 1531_______________________________________
T: 08/02/20 2027Maxwell Barnes MD            /nt

## 2020-08-05 NOTE — EEG
[address]  [address]                    EEG STUDY REPORT              
_______________________________________________________________________________
 
Name:       ALFREDA VELA         Room:           [address] [place]  
M.R.#:  [id number]      Account #:      [id number]  
Admission:  08/02/20     Attend Phys:    Hillary Love MD 
Discharge:  08/05/20     Date of Birth:  [birth year]  
         Report #: [id number]
                                                                     [id number]  
_______________________________________________________________________________
THIS REPORT FOR:  //name//                      
 
CC: Jam Love
 
DATE OF SERVICE:  08/04/2020
 
 
This patient is being evaluated for hypoxic encephalopathy.  The patient's EEG
was done by placing the electrode by standard 10-20 system of electrode
placement.  Both referential and sequential montages were used for recording. 
At 7 microvolt, this patient shows no cortical activity.  At 2 microvolt it is
intermixed with a lot of artifact and is difficult to tell.  Photic stimulation
is unremarkable.
 
IMPRESSION:  At the usual sensitivity of 7 microvolts the patient's EEG shows no
cortical activity.  At sensitivity of 2, lot of artifact is present either the
patient does not have any activity or has very low voltage activity.  This EEG
needs to be repeated after taking him off the sedation to determine the
significance of this finding.
 
 
 
 
 
 
 
 
 
 
 
 
 
 
 
 
 
 
 
 
 
 
 
 
<ELECTRONICALLY SIGNED>
                                        By:  Maxwell Barnes MD         
08/05/20 2000
D: 08/04/20 1239_______________________________________
T: 08/04/20 1249Maxwell Barnes MD            /nt

## 2020-08-05 NOTE — NUR
PT DECLARED BRAIN DEAD BY DR SALDANA AT 1402. SISTER NOTIFIED. MTN NOTIFIED
AND IN THE UNIT CO-ORDINATING THE CARE. DISCHARGED FROM HOSPITALIST AND PLAN
TO ADMIT UNDER MTN. ALL CONSULTS NOTIFIED.

## 2020-08-06 NOTE — 2DMMODE
[place] [address]
Phone:  [phone]                     2 D/M-MODE ECHOCARDIOGRAM     
_______________________________________________________________________________
 
Name:         ALFREDA VELA        Room:          [facility]    [facility] [place] 
.R.#:    [id number]     Account #:     [id number]  
Admission:    08/02/20    Attend Phys:   Hillary Love, 
Discharge:    08/05/20    Date of Birth: [birth year]  
Date of Service: 08/06/20 1424  Report #:      [id number]
        [id number]
_______________________________________________________________________________
THIS REPORT FOR:
 
cc:  Jam Chaparro MD, Khanh MD Liston, Michael J. MD [facility]     
                                                                       ~
 
--------------- ADDENDUM APPROVED REPORT --------------
 
 
Study performed:  08/03/2020 09:58:54
 
EXAM: limited 2D, and color-flow Echocardiogram 
Patient Location: In-Patient   
Room #:  005     Status:  routine
 
      BSA:         2.19
HR: 96 bpm BP:          122/58 mmHg 
Rhythm: NSR     
 
Other Information 
Technically limited study due to  body habitus, inability to position 
patient, patient on ventilator.
 
Indications
Arrhythmia
 
2D Dimensions
LVDd:  36.70 mm  
LVPWs:  10.00 mm 
PWd:  9.00 (7-11mm) 
LVDs:  21.80 (25-40mm) 
 
Left Ventricle
The left ventricle is normal size. There is normal left ventricular 
wall thickness. The left ventricular systolic function is normal. The 
left ventricular ejection fraction is within the normal range. This 
study is not technically sufficient to allow evaluation of the LV 
diastolic function.
 
Right Ventricle
The right ventricle is normal size. The right ventricular systolic 
function is normal.
 
Atria
The left atrium size is normal. The right atrium size is 
 
 
[facility]
[address] [place] [address]
Phone:  [phone]                     2 D/M-MODE ECHOCARDIOGRAM     
_______________________________________________________________________________
 
Name:         ALFREDA VELA        Room:          [address] [place] 
..#:    [id number]     Account #:     [id number]  
Admission:    08/02/20    Attend Phys:   Hillary Love, 
Discharge:    08/05/20    Date of Birth: [birth year]  
Date of Service: 08/06/20 1424  Report #:      [id number]
        [id number]
_______________________________________________________________________________
normal.
 
Aortic Valve
The aortic valve is not well visualized.
 
Mitral Valve
The mitral valve is normal in structure. There is trace mitral valve 
regurgitation noted.
 
Tricuspid Valve
Tricuspid valve is not well visualized. There is no tricuspid valve 
regurgitation noted.
 
Pulmonic Valve
Pulmonic valve is not well visualized.
 
Great Vessels
The aortic root is normal in size. IVC is not well 
visualized.
 
Pericardium
There is no pericardial effusion.
 
<Conclusion>
The left ventricular systolic function is normal.
The left ventricular ejection fraction is within the normal range.
 
 
 
 
 
 
 
 
 
 
 
 
 
 
 
 
 
 
  <ELECTRONICALLY SIGNED>
                                           By: Andrea Kumar MD, [facility]   
  08/06/20     1424
D: 08/06/20 1424   _____________________________________
T: 08/06/20 1424   Andrea Kumar MD, FACC     /INF